# Patient Record
Sex: FEMALE | HISPANIC OR LATINO | URBAN - METROPOLITAN AREA
[De-identification: names, ages, dates, MRNs, and addresses within clinical notes are randomized per-mention and may not be internally consistent; named-entity substitution may affect disease eponyms.]

---

## 2023-01-04 ENCOUNTER — OFFICE VISIT (OUTPATIENT)
Dept: FAMILY MEDICINE CLINIC | Facility: CLINIC | Age: 73
End: 2023-01-04
Payer: MEDICAID

## 2023-01-04 VITALS
WEIGHT: 157 LBS | TEMPERATURE: 98 F | SYSTOLIC BLOOD PRESSURE: 178 MMHG | HEART RATE: 71 BPM | OXYGEN SATURATION: 99 % | BODY MASS INDEX: 27.82 KG/M2 | DIASTOLIC BLOOD PRESSURE: 90 MMHG | HEIGHT: 63 IN

## 2023-01-04 DIAGNOSIS — Z85.3 HISTORY OF BREAST CANCER: ICD-10-CM

## 2023-01-04 DIAGNOSIS — M25.551 RIGHT HIP PAIN: Primary | ICD-10-CM

## 2023-01-04 DIAGNOSIS — I10 BENIGN ESSENTIAL HYPERTENSION: ICD-10-CM

## 2023-01-04 DIAGNOSIS — Z12.31 ENCOUNTER FOR SCREENING MAMMOGRAM FOR MALIGNANT NEOPLASM OF BREAST: ICD-10-CM

## 2023-01-04 DIAGNOSIS — Z12.11 SCREENING FOR COLON CANCER: ICD-10-CM

## 2023-01-04 DIAGNOSIS — R73.9 ELEVATED BLOOD SUGAR: ICD-10-CM

## 2023-01-04 DIAGNOSIS — E03.3 POST-INFECTIOUS HYPOTHYROIDISM: ICD-10-CM

## 2023-01-04 DIAGNOSIS — F41.9 ANXIETY: ICD-10-CM

## 2023-01-04 DIAGNOSIS — E78.2 HYPERLIPIDEMIA, MIXED: ICD-10-CM

## 2023-01-04 DIAGNOSIS — Z23 NEED FOR VACCINATION: ICD-10-CM

## 2023-01-04 PROBLEM — Z79.811 AROMATASE INHIBITOR USE: Status: ACTIVE | Noted: 2020-02-10

## 2023-01-04 PROBLEM — Z00.00 ROUTINE HISTORY AND PHYSICAL EXAMINATION OF ADULT: Status: ACTIVE | Noted: 2021-09-22

## 2023-01-04 PROBLEM — D05.12 BREAST NEOPLASM, TIS (DCIS), LEFT: Status: ACTIVE | Noted: 2019-11-19

## 2023-01-04 PROBLEM — M81.0 OSTEOPOROSIS: Status: ACTIVE | Noted: 2020-01-13

## 2023-01-04 PROCEDURE — 90471 IMMUNIZATION ADMIN: CPT | Performed by: STUDENT IN AN ORGANIZED HEALTH CARE EDUCATION/TRAINING PROGRAM

## 2023-01-04 PROCEDURE — 73502 X-RAY EXAM HIP UNI 2-3 VIEWS: CPT | Performed by: STUDENT IN AN ORGANIZED HEALTH CARE EDUCATION/TRAINING PROGRAM

## 2023-01-04 PROCEDURE — 90677 PCV20 VACCINE IM: CPT | Performed by: STUDENT IN AN ORGANIZED HEALTH CARE EDUCATION/TRAINING PROGRAM

## 2023-01-04 PROCEDURE — 99204 OFFICE O/P NEW MOD 45 MIN: CPT | Performed by: STUDENT IN AN ORGANIZED HEALTH CARE EDUCATION/TRAINING PROGRAM

## 2023-01-04 RX ORDER — ATORVASTATIN CALCIUM 80 MG/1
80 TABLET, FILM COATED ORAL DAILY
COMMUNITY
Start: 2022-09-22 | End: 2023-01-04 | Stop reason: SDUPTHER

## 2023-01-04 RX ORDER — ANASTROZOLE 1 MG/1
1 TABLET ORAL DAILY
COMMUNITY
Start: 2022-10-14 | End: 2023-01-04 | Stop reason: SDUPTHER

## 2023-01-04 RX ORDER — LEVOTHYROXINE SODIUM 0.05 MG/1
50 TABLET ORAL
Qty: 90 TABLET | Refills: 3 | Status: SHIPPED | OUTPATIENT
Start: 2023-01-04

## 2023-01-04 RX ORDER — B-COMPLEX WITH VITAMIN C
1 TABLET ORAL
COMMUNITY
Start: 2022-09-22 | End: 2023-03-21

## 2023-01-04 RX ORDER — ANASTROZOLE 1 MG/1
1 TABLET ORAL DAILY
Qty: 90 TABLET | Refills: 3 | Status: SHIPPED | OUTPATIENT
Start: 2023-01-04

## 2023-01-04 RX ORDER — FLUOXETINE HYDROCHLORIDE 20 MG/1
20 CAPSULE ORAL DAILY
Qty: 90 CAPSULE | Refills: 3 | Status: SHIPPED | OUTPATIENT
Start: 2023-01-04 | End: 2023-02-15

## 2023-01-04 RX ORDER — ENALAPRIL MALEATE 20 MG/1
20 TABLET ORAL 2 TIMES DAILY
COMMUNITY
Start: 2022-09-22 | End: 2023-01-04 | Stop reason: SDUPTHER

## 2023-01-04 RX ORDER — AMLODIPINE BESYLATE 5 MG/1
5 TABLET ORAL DAILY
Qty: 90 TABLET | Refills: 3 | Status: SHIPPED | OUTPATIENT
Start: 2023-01-04 | End: 2023-07-03

## 2023-01-04 RX ORDER — LEVOTHYROXINE SODIUM 0.05 MG/1
TABLET ORAL
COMMUNITY
Start: 2022-12-21 | End: 2023-01-04 | Stop reason: SDUPTHER

## 2023-01-04 RX ORDER — AMLODIPINE BESYLATE 5 MG/1
5 TABLET ORAL DAILY
COMMUNITY
Start: 2022-09-22 | End: 2023-01-04 | Stop reason: SDUPTHER

## 2023-01-04 RX ORDER — CHLORTHALIDONE 25 MG/1
25 TABLET ORAL DAILY
COMMUNITY
Start: 2022-09-22 | End: 2023-01-04 | Stop reason: SDUPTHER

## 2023-01-04 RX ORDER — MELOXICAM 15 MG/1
15 TABLET ORAL DAILY
Qty: 30 TABLET | Refills: 2 | Status: SHIPPED | OUTPATIENT
Start: 2023-01-04

## 2023-01-04 RX ORDER — CHLORTHALIDONE 25 MG/1
25 TABLET ORAL DAILY
Qty: 90 TABLET | Refills: 3 | Status: SHIPPED | OUTPATIENT
Start: 2023-01-04 | End: 2023-07-03

## 2023-01-04 RX ORDER — FLUOXETINE HYDROCHLORIDE 20 MG/1
20 CAPSULE ORAL DAILY
COMMUNITY
Start: 2022-09-22 | End: 2023-01-04 | Stop reason: SDUPTHER

## 2023-01-04 RX ORDER — ENALAPRIL MALEATE 20 MG/1
40 TABLET ORAL DAILY
Qty: 180 TABLET | Refills: 3 | Status: SHIPPED | OUTPATIENT
Start: 2023-01-04 | End: 2023-02-15 | Stop reason: SDUPTHER

## 2023-01-04 RX ORDER — ATORVASTATIN CALCIUM 80 MG/1
80 TABLET, FILM COATED ORAL DAILY
Qty: 90 TABLET | Refills: 3 | Status: SHIPPED | OUTPATIENT
Start: 2023-01-04 | End: 2023-07-03

## 2023-01-05 LAB
ALBUMIN SERPL-MCNC: 5.3 G/DL (ref 3.5–5.2)
ALBUMIN/GLOB SERPL: 2 G/DL
ALP SERPL-CCNC: 84 U/L (ref 39–117)
ALT SERPL-CCNC: 15 U/L (ref 1–33)
AST SERPL-CCNC: 18 U/L (ref 1–32)
BILIRUB SERPL-MCNC: 0.4 MG/DL (ref 0–1.2)
BUN SERPL-MCNC: 24 MG/DL (ref 8–23)
BUN/CREAT SERPL: 28.9 (ref 7–25)
CALCIUM SERPL-MCNC: 10.5 MG/DL (ref 8.6–10.5)
CHLORIDE SERPL-SCNC: 101 MMOL/L (ref 98–107)
CHOLEST SERPL-MCNC: 256 MG/DL (ref 0–200)
CO2 SERPL-SCNC: 27.6 MMOL/L (ref 22–29)
CREAT SERPL-MCNC: 0.83 MG/DL (ref 0.57–1)
EGFRCR SERPLBLD CKD-EPI 2021: 75.5 ML/MIN/1.73
GLOBULIN SER CALC-MCNC: 2.7 GM/DL
GLUCOSE SERPL-MCNC: 99 MG/DL (ref 65–99)
HBA1C MFR BLD: 5.8 % (ref 4.8–5.6)
HDLC SERPL-MCNC: 114 MG/DL (ref 40–60)
LDLC SERPL CALC-MCNC: 129 MG/DL (ref 0–100)
POTASSIUM SERPL-SCNC: 5.5 MMOL/L (ref 3.5–5.2)
PROT SERPL-MCNC: 8 G/DL (ref 6–8.5)
SODIUM SERPL-SCNC: 142 MMOL/L (ref 136–145)
TRIGL SERPL-MCNC: 77 MG/DL (ref 0–150)
TSH SERPL DL<=0.005 MIU/L-ACNC: 1.21 UIU/ML (ref 0.27–4.2)
VLDLC SERPL CALC-MCNC: 13 MG/DL (ref 5–40)

## 2023-01-16 ENCOUNTER — OFFICE VISIT (OUTPATIENT)
Dept: SPORTS MEDICINE | Facility: CLINIC | Age: 73
End: 2023-01-16
Payer: MEDICAID

## 2023-01-16 VITALS
DIASTOLIC BLOOD PRESSURE: 90 MMHG | SYSTOLIC BLOOD PRESSURE: 150 MMHG | RESPIRATION RATE: 16 BRPM | TEMPERATURE: 98 F | HEIGHT: 63 IN | BODY MASS INDEX: 27.82 KG/M2 | OXYGEN SATURATION: 99 % | HEART RATE: 100 BPM | WEIGHT: 157 LBS

## 2023-01-16 DIAGNOSIS — M16.11 ARTHRITIS OF RIGHT HIP: Primary | Chronic | ICD-10-CM

## 2023-01-16 PROCEDURE — 99214 OFFICE O/P EST MOD 30 MIN: CPT | Performed by: FAMILY MEDICINE

## 2023-01-19 ENCOUNTER — HOSPITAL ENCOUNTER (OUTPATIENT)
Dept: MAMMOGRAPHY | Facility: HOSPITAL | Age: 73
Discharge: HOME | End: 2023-01-19
Admitting: STUDENT IN AN ORGANIZED HEALTH CARE EDUCATION/TRAINING PROGRAM
Payer: MEDICAID

## 2023-01-19 DIAGNOSIS — Z12.31 ENCOUNTER FOR SCREENING MAMMOGRAM FOR MALIGNANT NEOPLASM OF BREAST: ICD-10-CM

## 2023-01-19 PROCEDURE — 77063 BREAST TOMOSYNTHESIS BI: CPT

## 2023-01-19 PROCEDURE — 77067 SCR MAMMO BI INCL CAD: CPT

## 2023-01-26 ENCOUNTER — PROCEDURE VISIT (OUTPATIENT)
Dept: SPORTS MEDICINE | Facility: CLINIC | Age: 73
End: 2023-01-26
Payer: MEDICAID

## 2023-01-26 VITALS
SYSTOLIC BLOOD PRESSURE: 152 MMHG | HEART RATE: 81 BPM | TEMPERATURE: 98.4 F | OXYGEN SATURATION: 97 % | DIASTOLIC BLOOD PRESSURE: 78 MMHG

## 2023-01-26 DIAGNOSIS — M16.11 ARTHRITIS OF RIGHT HIP: Primary | ICD-10-CM

## 2023-01-26 PROCEDURE — 20611 DRAIN/INJ JOINT/BURSA W/US: CPT | Performed by: FAMILY MEDICINE

## 2023-01-26 RX ORDER — TRIAMCINOLONE ACETONIDE 40 MG/ML
80 INJECTION, SUSPENSION INTRA-ARTICULAR; INTRAMUSCULAR ONCE
Status: COMPLETED | OUTPATIENT
Start: 2023-01-26 | End: 2023-01-26

## 2023-01-26 RX ADMIN — TRIAMCINOLONE ACETONIDE 80 MG: 40 INJECTION, SUSPENSION INTRA-ARTICULAR; INTRAMUSCULAR at 16:40

## 2023-02-15 ENCOUNTER — OFFICE VISIT (OUTPATIENT)
Dept: FAMILY MEDICINE CLINIC | Facility: CLINIC | Age: 73
End: 2023-02-15
Payer: MEDICAID

## 2023-02-15 VITALS
OXYGEN SATURATION: 98 % | HEART RATE: 83 BPM | DIASTOLIC BLOOD PRESSURE: 60 MMHG | BODY MASS INDEX: 26.75 KG/M2 | SYSTOLIC BLOOD PRESSURE: 116 MMHG | TEMPERATURE: 97.3 F | HEIGHT: 63 IN | WEIGHT: 151 LBS

## 2023-02-15 DIAGNOSIS — G89.29 CHRONIC PAIN OF RIGHT KNEE: Primary | ICD-10-CM

## 2023-02-15 DIAGNOSIS — M25.561 CHRONIC PAIN OF RIGHT KNEE: Primary | ICD-10-CM

## 2023-02-15 DIAGNOSIS — I10 BENIGN ESSENTIAL HYPERTENSION: ICD-10-CM

## 2023-02-15 PROBLEM — M25.551 RIGHT HIP PAIN: Status: RESOLVED | Noted: 2023-01-04 | Resolved: 2023-02-15

## 2023-02-15 PROBLEM — M16.11 OSTEOARTHRITIS OF RIGHT HIP: Status: ACTIVE | Noted: 2023-02-15

## 2023-02-15 PROCEDURE — 73562 X-RAY EXAM OF KNEE 3: CPT | Performed by: STUDENT IN AN ORGANIZED HEALTH CARE EDUCATION/TRAINING PROGRAM

## 2023-02-15 PROCEDURE — 99214 OFFICE O/P EST MOD 30 MIN: CPT | Performed by: STUDENT IN AN ORGANIZED HEALTH CARE EDUCATION/TRAINING PROGRAM

## 2023-02-15 RX ORDER — ERGOCALCIFEROL 1.25 MG/1
50000 CAPSULE ORAL
COMMUNITY
Start: 2022-09-22 | End: 2023-03-21

## 2023-02-15 RX ORDER — ENALAPRIL MALEATE 20 MG/1
40 TABLET ORAL 2 TIMES DAILY
Qty: 180 TABLET | Refills: 3 | Status: SHIPPED | OUTPATIENT
Start: 2023-02-15 | End: 2023-03-01 | Stop reason: SDUPTHER

## 2023-03-01 ENCOUNTER — TELEPHONE (OUTPATIENT)
Dept: FAMILY MEDICINE CLINIC | Facility: CLINIC | Age: 73
End: 2023-03-01
Payer: MEDICAID

## 2023-03-01 DIAGNOSIS — I10 BENIGN ESSENTIAL HYPERTENSION: ICD-10-CM

## 2023-03-01 RX ORDER — ENALAPRIL MALEATE 20 MG/1
40 TABLET ORAL DAILY
Qty: 180 TABLET | Refills: 3 | Status: SHIPPED | OUTPATIENT
Start: 2023-03-01 | End: 2023-03-06 | Stop reason: SDUPTHER

## 2023-03-06 DIAGNOSIS — I10 BENIGN ESSENTIAL HYPERTENSION: ICD-10-CM

## 2023-03-06 RX ORDER — ENALAPRIL MALEATE 20 MG/1
40 TABLET ORAL DAILY
Qty: 180 TABLET | Refills: 1 | Status: SHIPPED | OUTPATIENT
Start: 2023-03-06

## 2023-03-22 ENCOUNTER — TELEPHONE (OUTPATIENT)
Dept: FAMILY MEDICINE CLINIC | Facility: CLINIC | Age: 73
End: 2023-03-22
Payer: MEDICAID

## 2023-05-09 RX ORDER — ERGOCALCIFEROL 1.25 MG/1
50000 CAPSULE ORAL WEEKLY
Qty: 12 CAPSULE | Refills: 2 | Status: SHIPPED | OUTPATIENT
Start: 2023-05-09

## 2023-06-06 ENCOUNTER — PRIOR AUTHORIZATION (OUTPATIENT)
Dept: FAMILY MEDICINE CLINIC | Facility: CLINIC | Age: 73
End: 2023-06-06
Payer: MEDICAID

## 2023-09-08 ENCOUNTER — TELEPHONE (OUTPATIENT)
Dept: SPORTS MEDICINE | Facility: CLINIC | Age: 73
End: 2023-09-08

## 2023-09-13 ENCOUNTER — OFFICE VISIT (OUTPATIENT)
Dept: FAMILY MEDICINE CLINIC | Facility: CLINIC | Age: 73
End: 2023-09-13
Payer: MEDICAID

## 2023-09-13 VITALS
HEIGHT: 63 IN | OXYGEN SATURATION: 97 % | BODY MASS INDEX: 27.82 KG/M2 | WEIGHT: 157 LBS | HEART RATE: 60 BPM | DIASTOLIC BLOOD PRESSURE: 84 MMHG | TEMPERATURE: 97.6 F | SYSTOLIC BLOOD PRESSURE: 136 MMHG

## 2023-09-13 DIAGNOSIS — E78.2 HYPERLIPIDEMIA, MIXED: ICD-10-CM

## 2023-09-13 DIAGNOSIS — Z00.00 ENCOUNTER FOR HEALTH MAINTENANCE EXAMINATION IN ADULT: Primary | ICD-10-CM

## 2023-09-13 DIAGNOSIS — R73.09 ELEVATED HEMOGLOBIN A1C: ICD-10-CM

## 2023-09-13 DIAGNOSIS — I10 BENIGN ESSENTIAL HYPERTENSION: ICD-10-CM

## 2023-09-13 DIAGNOSIS — Z13.0 SCREENING FOR DEFICIENCY ANEMIA: ICD-10-CM

## 2023-09-13 DIAGNOSIS — E03.3 POST-INFECTIOUS HYPOTHYROIDISM: ICD-10-CM

## 2023-09-13 PROCEDURE — 3079F DIAST BP 80-89 MM HG: CPT | Performed by: STUDENT IN AN ORGANIZED HEALTH CARE EDUCATION/TRAINING PROGRAM

## 2023-09-13 PROCEDURE — 3075F SYST BP GE 130 - 139MM HG: CPT | Performed by: STUDENT IN AN ORGANIZED HEALTH CARE EDUCATION/TRAINING PROGRAM

## 2023-09-13 PROCEDURE — 1160F RVW MEDS BY RX/DR IN RCRD: CPT | Performed by: STUDENT IN AN ORGANIZED HEALTH CARE EDUCATION/TRAINING PROGRAM

## 2023-09-13 PROCEDURE — 1159F MED LIST DOCD IN RCRD: CPT | Performed by: STUDENT IN AN ORGANIZED HEALTH CARE EDUCATION/TRAINING PROGRAM

## 2023-09-13 PROCEDURE — 99397 PER PM REEVAL EST PAT 65+ YR: CPT | Performed by: STUDENT IN AN ORGANIZED HEALTH CARE EDUCATION/TRAINING PROGRAM

## 2023-09-14 LAB
ALBUMIN SERPL-MCNC: 4.6 G/DL (ref 3.5–5.2)
ALBUMIN/GLOB SERPL: 1.7 G/DL
ALP SERPL-CCNC: 89 U/L (ref 39–117)
ALT SERPL-CCNC: 19 U/L (ref 1–33)
AST SERPL-CCNC: 40 U/L (ref 1–32)
BASOPHILS # BLD AUTO: 0.05 10*3/MM3 (ref 0–0.2)
BASOPHILS NFR BLD AUTO: 0.8 % (ref 0–1.5)
BILIRUB SERPL-MCNC: 0.5 MG/DL (ref 0–1.2)
BUN SERPL-MCNC: 26 MG/DL (ref 8–23)
BUN/CREAT SERPL: 32.9 (ref 7–25)
CALCIUM SERPL-MCNC: 10.3 MG/DL (ref 8.6–10.5)
CHLORIDE SERPL-SCNC: 101 MMOL/L (ref 98–107)
CHOLEST SERPL-MCNC: 202 MG/DL (ref 0–200)
CO2 SERPL-SCNC: 30.3 MMOL/L (ref 22–29)
CREAT SERPL-MCNC: 0.79 MG/DL (ref 0.57–1)
EGFRCR SERPLBLD CKD-EPI 2021: 79.6 ML/MIN/1.73
EOSINOPHIL # BLD AUTO: 0.34 10*3/MM3 (ref 0–0.4)
EOSINOPHIL NFR BLD AUTO: 5.3 % (ref 0.3–6.2)
ERYTHROCYTE [DISTWIDTH] IN BLOOD BY AUTOMATED COUNT: 11.9 % (ref 12.3–15.4)
GLOBULIN SER CALC-MCNC: 2.7 GM/DL
GLUCOSE SERPL-MCNC: 98 MG/DL (ref 65–99)
HBA1C MFR BLD: 6.5 % (ref 4.8–5.6)
HCT VFR BLD AUTO: 36.4 % (ref 34–46.6)
HDLC SERPL-MCNC: 89 MG/DL (ref 40–60)
HGB BLD-MCNC: 12.3 G/DL (ref 12–15.9)
IMM GRANULOCYTES # BLD AUTO: 0.02 10*3/MM3 (ref 0–0.05)
IMM GRANULOCYTES NFR BLD AUTO: 0.3 % (ref 0–0.5)
LDLC SERPL CALC-MCNC: 100 MG/DL (ref 0–100)
LYMPHOCYTES # BLD AUTO: 1.79 10*3/MM3 (ref 0.7–3.1)
LYMPHOCYTES NFR BLD AUTO: 27.8 % (ref 19.6–45.3)
MCH RBC QN AUTO: 31 PG (ref 26.6–33)
MCHC RBC AUTO-ENTMCNC: 33.8 G/DL (ref 31.5–35.7)
MCV RBC AUTO: 91.7 FL (ref 79–97)
MONOCYTES # BLD AUTO: 0.56 10*3/MM3 (ref 0.1–0.9)
MONOCYTES NFR BLD AUTO: 8.7 % (ref 5–12)
NEUTROPHILS # BLD AUTO: 3.69 10*3/MM3 (ref 1.7–7)
NEUTROPHILS NFR BLD AUTO: 57.1 % (ref 42.7–76)
NRBC BLD AUTO-RTO: 0 /100 WBC (ref 0–0.2)
PLATELET # BLD AUTO: 264 10*3/MM3 (ref 140–450)
POTASSIUM SERPL-SCNC: 4.7 MMOL/L (ref 3.5–5.2)
PROT SERPL-MCNC: 7.3 G/DL (ref 6–8.5)
RBC # BLD AUTO: 3.97 10*6/MM3 (ref 3.77–5.28)
SODIUM SERPL-SCNC: 140 MMOL/L (ref 136–145)
TRIGL SERPL-MCNC: 75 MG/DL (ref 0–150)
TSH SERPL DL<=0.005 MIU/L-ACNC: 2.41 UIU/ML (ref 0.27–4.2)
VLDLC SERPL CALC-MCNC: 13 MG/DL (ref 5–40)
WBC # BLD AUTO: 6.45 10*3/MM3 (ref 3.4–10.8)

## 2023-10-04 ENCOUNTER — OFFICE VISIT (OUTPATIENT)
Dept: SPORTS MEDICINE | Facility: CLINIC | Age: 73
End: 2023-10-04
Payer: MEDICARE

## 2023-10-04 VITALS
WEIGHT: 157 LBS | BODY MASS INDEX: 27.82 KG/M2 | TEMPERATURE: 98.3 F | HEIGHT: 63 IN | OXYGEN SATURATION: 98 % | HEART RATE: 75 BPM | SYSTOLIC BLOOD PRESSURE: 180 MMHG | DIASTOLIC BLOOD PRESSURE: 88 MMHG

## 2023-10-04 DIAGNOSIS — M16.11 ARTHRITIS OF RIGHT HIP: Primary | ICD-10-CM

## 2023-10-26 RX ORDER — ATORVASTATIN CALCIUM 80 MG/1
80 TABLET, FILM COATED ORAL DAILY
Qty: 90 TABLET | Refills: 1 | Status: SHIPPED | OUTPATIENT
Start: 2023-10-26

## 2023-12-31 DIAGNOSIS — I10 BENIGN ESSENTIAL HYPERTENSION: ICD-10-CM

## 2023-12-31 DIAGNOSIS — Z85.3 HISTORY OF BREAST CANCER: ICD-10-CM

## 2024-01-02 RX ORDER — CHLORTHALIDONE 25 MG/1
25 TABLET ORAL DAILY
Qty: 90 TABLET | Refills: 1 | Status: SHIPPED | OUTPATIENT
Start: 2024-01-02

## 2024-01-02 RX ORDER — ANASTROZOLE 1 MG/1
1 TABLET ORAL DAILY
Qty: 90 TABLET | Refills: 3 | Status: SHIPPED | OUTPATIENT
Start: 2024-01-02

## 2024-01-02 RX ORDER — AMLODIPINE BESYLATE 5 MG/1
5 TABLET ORAL DAILY
Qty: 90 TABLET | Refills: 3 | OUTPATIENT
Start: 2024-01-02

## 2024-01-15 DIAGNOSIS — I10 BENIGN ESSENTIAL HYPERTENSION: ICD-10-CM

## 2024-01-15 RX ORDER — ENALAPRIL MALEATE 20 MG/1
TABLET ORAL
Qty: 180 TABLET | Refills: 1 | Status: SHIPPED | OUTPATIENT
Start: 2024-01-15

## 2024-02-05 DIAGNOSIS — M25.551 RIGHT HIP PAIN: ICD-10-CM

## 2024-02-05 DIAGNOSIS — E03.3 POST-INFECTIOUS HYPOTHYROIDISM: ICD-10-CM

## 2024-02-05 RX ORDER — LEVOTHYROXINE SODIUM 0.05 MG/1
50 TABLET ORAL
Qty: 90 TABLET | Refills: 3 | Status: SHIPPED | OUTPATIENT
Start: 2024-02-05

## 2024-02-05 RX ORDER — MELOXICAM 15 MG/1
15 TABLET ORAL DAILY
Qty: 90 TABLET | Refills: 1 | Status: SHIPPED | OUTPATIENT
Start: 2024-02-05

## 2024-05-09 RX ORDER — ATORVASTATIN CALCIUM 80 MG/1
80 TABLET, FILM COATED ORAL DAILY
Qty: 90 TABLET | Refills: 1 | Status: SHIPPED | OUTPATIENT
Start: 2024-05-09

## 2024-05-22 ENCOUNTER — OFFICE VISIT (OUTPATIENT)
Dept: SPORTS MEDICINE | Facility: CLINIC | Age: 74
End: 2024-05-22
Payer: MEDICARE

## 2024-05-22 ENCOUNTER — OFFICE VISIT (OUTPATIENT)
Dept: FAMILY MEDICINE CLINIC | Facility: CLINIC | Age: 74
End: 2024-05-22
Payer: MEDICARE

## 2024-05-22 VITALS
HEIGHT: 63 IN | SYSTOLIC BLOOD PRESSURE: 130 MMHG | BODY MASS INDEX: 27.46 KG/M2 | RESPIRATION RATE: 16 BRPM | DIASTOLIC BLOOD PRESSURE: 80 MMHG | OXYGEN SATURATION: 98 % | WEIGHT: 155 LBS | HEART RATE: 73 BPM

## 2024-05-22 VITALS
HEART RATE: 76 BPM | SYSTOLIC BLOOD PRESSURE: 132 MMHG | HEIGHT: 63 IN | DIASTOLIC BLOOD PRESSURE: 78 MMHG | OXYGEN SATURATION: 97 % | WEIGHT: 155 LBS | BODY MASS INDEX: 27.46 KG/M2

## 2024-05-22 DIAGNOSIS — E11.9 TYPE 2 DIABETES MELLITUS WITHOUT COMPLICATION, WITHOUT LONG-TERM CURRENT USE OF INSULIN: Primary | ICD-10-CM

## 2024-05-22 DIAGNOSIS — M16.11 PRIMARY OSTEOARTHRITIS OF RIGHT HIP: Primary | ICD-10-CM

## 2024-05-22 DIAGNOSIS — M25.551 PAIN OF RIGHT HIP: ICD-10-CM

## 2024-05-22 DIAGNOSIS — I10 BENIGN ESSENTIAL HYPERTENSION: ICD-10-CM

## 2024-05-22 DIAGNOSIS — Z12.31 ENCOUNTER FOR SCREENING MAMMOGRAM FOR MALIGNANT NEOPLASM OF BREAST: ICD-10-CM

## 2024-05-22 PROCEDURE — 20611 DRAIN/INJ JOINT/BURSA W/US: CPT | Performed by: FAMILY MEDICINE

## 2024-05-22 PROCEDURE — 1159F MED LIST DOCD IN RCRD: CPT | Performed by: STUDENT IN AN ORGANIZED HEALTH CARE EDUCATION/TRAINING PROGRAM

## 2024-05-22 PROCEDURE — 1125F AMNT PAIN NOTED PAIN PRSNT: CPT | Performed by: STUDENT IN AN ORGANIZED HEALTH CARE EDUCATION/TRAINING PROGRAM

## 2024-05-22 PROCEDURE — 3078F DIAST BP <80 MM HG: CPT | Performed by: STUDENT IN AN ORGANIZED HEALTH CARE EDUCATION/TRAINING PROGRAM

## 2024-05-22 PROCEDURE — 3075F SYST BP GE 130 - 139MM HG: CPT | Performed by: STUDENT IN AN ORGANIZED HEALTH CARE EDUCATION/TRAINING PROGRAM

## 2024-05-22 PROCEDURE — 99213 OFFICE O/P EST LOW 20 MIN: CPT | Performed by: FAMILY MEDICINE

## 2024-05-22 PROCEDURE — 3079F DIAST BP 80-89 MM HG: CPT | Performed by: FAMILY MEDICINE

## 2024-05-22 PROCEDURE — 99214 OFFICE O/P EST MOD 30 MIN: CPT | Performed by: STUDENT IN AN ORGANIZED HEALTH CARE EDUCATION/TRAINING PROGRAM

## 2024-05-22 PROCEDURE — 3075F SYST BP GE 130 - 139MM HG: CPT | Performed by: FAMILY MEDICINE

## 2024-05-22 PROCEDURE — 1160F RVW MEDS BY RX/DR IN RCRD: CPT | Performed by: FAMILY MEDICINE

## 2024-05-22 PROCEDURE — 1159F MED LIST DOCD IN RCRD: CPT | Performed by: FAMILY MEDICINE

## 2024-05-22 PROCEDURE — 1160F RVW MEDS BY RX/DR IN RCRD: CPT | Performed by: STUDENT IN AN ORGANIZED HEALTH CARE EDUCATION/TRAINING PROGRAM

## 2024-05-22 RX ORDER — TRIAMCINOLONE ACETONIDE 40 MG/ML
80 INJECTION, SUSPENSION INTRA-ARTICULAR; INTRAMUSCULAR
Status: DISCONTINUED | OUTPATIENT
Start: 2024-05-22 | End: 2024-05-22 | Stop reason: HOSPADM

## 2024-05-22 RX ORDER — ENALAPRIL MALEATE 20 MG/1
40 TABLET ORAL DAILY
Qty: 180 TABLET | Refills: 3 | Status: SHIPPED | OUTPATIENT
Start: 2024-05-22

## 2024-05-22 RX ADMIN — TRIAMCINOLONE ACETONIDE 80 MG: 40 INJECTION, SUSPENSION INTRA-ARTICULAR; INTRAMUSCULAR at 16:25

## 2024-08-20 ENCOUNTER — OFFICE VISIT (OUTPATIENT)
Dept: ORTHOPEDIC SURGERY | Facility: CLINIC | Age: 74
End: 2024-08-20
Payer: MEDICARE

## 2024-08-20 VITALS — TEMPERATURE: 97.5 F | WEIGHT: 156.8 LBS | HEIGHT: 63 IN | BODY MASS INDEX: 27.78 KG/M2

## 2024-08-20 DIAGNOSIS — M16.11 PRIMARY OSTEOARTHRITIS OF RIGHT HIP: Primary | ICD-10-CM

## 2024-08-20 PROBLEM — M16.9 OA (OSTEOARTHRITIS) OF HIP: Status: ACTIVE | Noted: 2024-08-20

## 2024-08-20 PROCEDURE — 99204 OFFICE O/P NEW MOD 45 MIN: CPT | Performed by: ORTHOPAEDIC SURGERY

## 2024-08-20 PROCEDURE — 1159F MED LIST DOCD IN RCRD: CPT | Performed by: ORTHOPAEDIC SURGERY

## 2024-08-20 PROCEDURE — 1160F RVW MEDS BY RX/DR IN RCRD: CPT | Performed by: ORTHOPAEDIC SURGERY

## 2024-08-20 RX ORDER — CHLORHEXIDINE GLUCONATE 500 MG/1
CLOTH TOPICAL 2 TIMES DAILY
OUTPATIENT
Start: 2024-08-20

## 2024-08-20 RX ORDER — PREGABALIN 150 MG/1
150 CAPSULE ORAL ONCE
OUTPATIENT
Start: 2024-08-20 | End: 2024-08-20

## 2024-08-20 RX ORDER — MELOXICAM 7.5 MG/1
15 TABLET ORAL ONCE
OUTPATIENT
Start: 2024-08-20 | End: 2024-08-20

## 2024-10-02 DIAGNOSIS — I10 BENIGN ESSENTIAL HYPERTENSION: ICD-10-CM

## 2024-10-02 RX ORDER — CHLORTHALIDONE 25 MG/1
25 TABLET ORAL DAILY
Qty: 90 TABLET | Refills: 1 | Status: SHIPPED | OUTPATIENT
Start: 2024-10-02

## 2024-10-08 DIAGNOSIS — I10 BENIGN ESSENTIAL HYPERTENSION: ICD-10-CM

## 2024-10-08 DIAGNOSIS — M25.551 RIGHT HIP PAIN: ICD-10-CM

## 2024-10-08 RX ORDER — MELOXICAM 15 MG/1
15 TABLET ORAL DAILY
Qty: 90 TABLET | Refills: 1 | Status: SHIPPED | OUTPATIENT
Start: 2024-10-08

## 2024-10-08 RX ORDER — ATORVASTATIN CALCIUM 80 MG/1
80 TABLET, FILM COATED ORAL DAILY
Qty: 90 TABLET | Refills: 1 | Status: SHIPPED | OUTPATIENT
Start: 2024-10-08

## 2024-10-08 RX ORDER — CHLORTHALIDONE 25 MG/1
25 TABLET ORAL DAILY
Qty: 90 TABLET | Refills: 1 | Status: SHIPPED | OUTPATIENT
Start: 2024-10-08

## 2025-01-20 DIAGNOSIS — I10 BENIGN ESSENTIAL HYPERTENSION: ICD-10-CM

## 2025-01-20 DIAGNOSIS — Z85.3 HISTORY OF BREAST CANCER: ICD-10-CM

## 2025-01-20 DIAGNOSIS — E03.3 POST-INFECTIOUS HYPOTHYROIDISM: ICD-10-CM

## 2025-01-20 RX ORDER — ANASTROZOLE 1 MG/1
1 TABLET ORAL DAILY
Qty: 90 TABLET | Refills: 3 | Status: SHIPPED | OUTPATIENT
Start: 2025-01-20

## 2025-01-20 RX ORDER — LEVOTHYROXINE SODIUM 50 UG/1
50 TABLET ORAL
Qty: 90 TABLET | Refills: 3 | Status: SHIPPED | OUTPATIENT
Start: 2025-01-20

## 2025-01-20 RX ORDER — CHLORTHALIDONE 25 MG/1
25 TABLET ORAL DAILY
Qty: 90 TABLET | Refills: 1 | Status: SHIPPED | OUTPATIENT
Start: 2025-01-20

## 2025-01-20 RX ORDER — ATORVASTATIN CALCIUM 80 MG/1
80 TABLET, FILM COATED ORAL DAILY
Qty: 90 TABLET | Refills: 1 | Status: SHIPPED | OUTPATIENT
Start: 2025-01-20

## 2025-01-20 RX ORDER — ENALAPRIL MALEATE 20 MG/1
40 TABLET ORAL DAILY
Qty: 180 TABLET | Refills: 3 | Status: SHIPPED | OUTPATIENT
Start: 2025-01-20

## 2025-01-27 ENCOUNTER — PRE-ADMISSION TESTING (OUTPATIENT)
Dept: PREADMISSION TESTING | Facility: HOSPITAL | Age: 75
End: 2025-01-27
Payer: MEDICARE

## 2025-01-27 VITALS
SYSTOLIC BLOOD PRESSURE: 158 MMHG | OXYGEN SATURATION: 96 % | WEIGHT: 151.8 LBS | BODY MASS INDEX: 29.8 KG/M2 | HEART RATE: 76 BPM | DIASTOLIC BLOOD PRESSURE: 76 MMHG | TEMPERATURE: 98.6 F | HEIGHT: 60 IN | RESPIRATION RATE: 18 BRPM

## 2025-01-27 LAB
ABO GROUP BLD: NORMAL
ANION GAP SERPL CALCULATED.3IONS-SCNC: 11.5 MMOL/L (ref 5–15)
BLD GP AB SCN SERPL QL: NEGATIVE
BUN SERPL-MCNC: 21 MG/DL (ref 8–23)
BUN/CREAT SERPL: 21.4 (ref 7–25)
CALCIUM SPEC-SCNC: 10.1 MG/DL (ref 8.6–10.5)
CHLORIDE SERPL-SCNC: 99 MMOL/L (ref 98–107)
CO2 SERPL-SCNC: 27.5 MMOL/L (ref 22–29)
CREAT SERPL-MCNC: 0.98 MG/DL (ref 0.57–1)
DEPRECATED RDW RBC AUTO: 41 FL (ref 37–54)
EGFRCR SERPLBLD CKD-EPI 2021: 61.1 ML/MIN/1.73
ERYTHROCYTE [DISTWIDTH] IN BLOOD BY AUTOMATED COUNT: 12.4 % (ref 12.3–15.4)
GLUCOSE SERPL-MCNC: 105 MG/DL (ref 65–99)
HCT VFR BLD AUTO: 35.3 % (ref 34–46.6)
HGB BLD-MCNC: 12 G/DL (ref 12–15.9)
MCH RBC QN AUTO: 31.1 PG (ref 26.6–33)
MCHC RBC AUTO-ENTMCNC: 34 G/DL (ref 31.5–35.7)
MCV RBC AUTO: 91.5 FL (ref 79–97)
PLATELET # BLD AUTO: 235 10*3/MM3 (ref 140–450)
PMV BLD AUTO: 11.5 FL (ref 6–12)
POTASSIUM SERPL-SCNC: 4.1 MMOL/L (ref 3.5–5.2)
QT INTERVAL: 411 MS
QTC INTERVAL: 424 MS
RBC # BLD AUTO: 3.86 10*6/MM3 (ref 3.77–5.28)
RH BLD: POSITIVE
SODIUM SERPL-SCNC: 138 MMOL/L (ref 136–145)
T&S EXPIRATION DATE: NORMAL
WBC NRBC COR # BLD AUTO: 8.51 10*3/MM3 (ref 3.4–10.8)

## 2025-01-27 PROCEDURE — 86900 BLOOD TYPING SEROLOGIC ABO: CPT | Performed by: ORTHOPAEDIC SURGERY

## 2025-01-27 PROCEDURE — 93005 ELECTROCARDIOGRAM TRACING: CPT

## 2025-01-27 PROCEDURE — 85027 COMPLETE CBC AUTOMATED: CPT

## 2025-01-27 PROCEDURE — 86850 RBC ANTIBODY SCREEN: CPT | Performed by: ORTHOPAEDIC SURGERY

## 2025-01-27 PROCEDURE — 86901 BLOOD TYPING SEROLOGIC RH(D): CPT | Performed by: ORTHOPAEDIC SURGERY

## 2025-01-27 PROCEDURE — 93010 ELECTROCARDIOGRAM REPORT: CPT | Performed by: INTERNAL MEDICINE

## 2025-01-27 PROCEDURE — 80048 BASIC METABOLIC PNL TOTAL CA: CPT

## 2025-01-27 PROCEDURE — 36415 COLL VENOUS BLD VENIPUNCTURE: CPT

## 2025-01-27 RX ORDER — CETIRIZINE HYDROCHLORIDE 5 MG/1
5 TABLET ORAL DAILY
COMMUNITY

## 2025-01-27 NOTE — DISCHARGE INSTRUCTIONS
Atalissa los siguientes medicamentos la mañana de la operación:    LEVOTHYROXINE     Si está tomando analgésicos narcóticos con receta para controlar el dolor, también puede tomarlos la mañana de la operación.         Instrucciones generales:       No coma comida sólida después de la medianoche anterior de  la operación.   Se permiten líquidos belkis el día de la cirugía, humberto deben suspenderse al menos dos horas antes de marx hora de llegada al hospital.         Líquidos belkis permitidos        Agua, refrescos y té o café sin crema ni leche añadida.         Se recomiendan de 12 a 20 onzas de un líquido telma que contenga carbohidratos.  Si no es diabético, tome Gatorade o Powerade.  Si es diabético, tome G2 o Powerade Zero.       No tome líquidos de color jon.   No consuma caldo de cass, res, cerdo o verduras ni cubitos de caldo de ninguna variedad, ya que no se consideran líquidos belkis y no están permitidos.      Los bebés pueden la nena leche materna hasta cuatro horas antes de la operación.   Los bebés que thanh fórmula pueden beber fórmula hasta seis horas antes de la operación.    Los pacientes que evitan fumar, masticar tabaco y beber alcohol jacqueline 4 semanas antes de la operación tienen un riesgo reducido de complicaciones posoperatorias. Deje de fumar tantos días antes de la operación caryn pueda.   No fume, mastique tabaco ni thee alcohol el día de la operación.    Si corresponde, traiga marx máquina C-PAP/BI-PAP el día previo a la operación.   Traiga todos los documentos que le entregaron en el consultorio del médico.   Use ropa limpia y cómoda.   No use lentes de contacto, pestañas postizas ni maquillaje. Traiga un estuche para thiago anteojos.    Traiga muletas o andador si corresponde.   Quítese todos los piercings. Deje las joyas y cualquier otro objeto de valor en casa.   Deben quitarse las extensiones de pelo con clips metálicos antes de la operación.   El enfermero de Pruebas Previas al Ingreso  (Pre-Admission Testing) le indicará que traiga medicamentos si no puede obtener morena lista precisa en las pruebas previas al ingreso.         Si le entregaron un brazalete de identificación del banco de linda, recuerde traerlo con usted el día de la operación.   Prevención de morena infección en el lugar de la operación:   Marnie 2 o 3 días antes de la operación, evite afeitarse con morena rasuradora porque esta puede irritar la piel y hacer que se desarrolle morena infección.   Cualquier área de piel abierta puede aumentar el riesgo de morena infección de la herida posoperatoria al permitir que las bacterias entren y se propaguen en todo el cuerpo. Informe a marx cirujano si tiene alguna herida/sarpullido en la piel, incluso si no está cerca del lugar de la operación. Será necesario evaluar el área para determinar si la operación debe retrasarse hasta que sane.   La noche antes de la operación, báñese con morena pastilla nueva de jabón antibacteriano (caryn Dial) y morena toalla limpia. Duerma en morena cama limpia con ropa limpia. No permita que las mascotas duerman con usted.   La mañana de la operación, báñese usando morena pastilla nueva de jabón antibacteriano (caryn Dial) y morena toalla limpia. Séquese con morena toalla limpia y vístase con ropa limpia.   Pregúntele al cirujano si recibirá antibióticos antes de la operación.   Asegúrese de que usted, marx michael y todos los proveedores de atención médica se laven las elvira con agua y jabón o con un desinfectante de elvira a base de alcohol antes de atenderlo o de curar marx herida.       INSTRUCCIONES PARA LAS TOLLAS DE CHLORHEXIDINE  La mañana de la operación, siga estas instrucciones usando las toallas de   Chlorhexidine que le entregaron. Estos pasos reducen las bacterias en el cuerpo. No   use las toallas cerca de los ojos, orejas, boca, genitales ni en heridas abiertas. Deseche   las toallas después de usarlas, humberto no intente tirarlas al inodoro.   Aba y quite morena toalla a la vez del  paquete.   Deje la toalla desdoblada y comience el baño.   Masajee la piel con las toallas ejerciendo presión suave para quitar las bacterias.  No frote con fuerza.    Siga los pasos de abajo con morena toalla con 2 % CHG por área (6 toallas en total).   Morena toalla para johnny, hombros y pecho.   Morena toalla para ambos brazos, elvira, dedos y axilas (las axilas al final).   Morena toalla para el abdomen seguido de la angela.   Morena toalla para la pierna y el pie derechos, incluyendo entre los dedos.   Morena toalla para la pierna y el pie izquierdos, incluyendo entre los dedos.   La última toalla se usará para la parte de atrás del johnny, la espalda y los glúteos.  Deje que el CHG se seque al aire jacqueline 3 minutos sobre la piel, lo que le dará tiempo   para actuar y disminuirá la posibilidad de irritación. La piel puede sentirse pegajosa   hasta que se seca. No enjuague con agua ni ningún otro líquido o perderá los efectos   beneficiosos del CHG. Si se produce morena irritación leve de la piel, enjuáguela para quitar   el CHG. Reporte esto al enfermero en el momento del ingreso. No aplique lociones,   cremas, ungüentos, desodorantes ni perfumes después de usar las toallas. Vístase con   ropa limpia antes de venir al hospital.    Día de la operación:   Marx hora de llegada es aproximadamente dos horas antes de la hora programada para marx operación. A marx llegada, un enfermero preoperatorio y un anestesista revisarán marx historia médica, obtendrán thiago signos vitales y responderán las preguntas que usted pueda tener. Las únicas cosas necesarias en jenn momento serán morena lista de thiago medicamentos de casa y, si corresponde, marx máquina C-PAP/BI-PAP. Un enfermero preoperatorio le pondrá morena vía intravenosa y es posible que reciba medicamentos caryn preparación para la operación, incluyendo algo para ayudarlo a relajarse.       Tenga en cuenta que la operación conlleva malestares. Queremos hacer todo lo posible para manejar marx malestar, por lo  que le pedimos que hable con marx enfermero sobre cualquier síntoma que no esté controlado. Lo más probable es que marx médico le recete analgésicos.      Si regresa a casa después de la operación, recibirá instrucciones de atención individualizadas por escrito antes de que le den el homer. Un adulto responsable debe estar con usted de margarita y vuelta del hospital el día de la operación y mantenerse con usted jacqueline la noche.       Las recetas del homer se pueden surtir en la farmacia del hospital jacqueline el horario habitual de la farmacia. Si la operación será al final del día/noche, es posible que marx receta la envíen electrónicamente a marx farmacia. Verifique el horario de marx farmacia o elija morena farmacia de 24 horas para evitar no poder acceder a marx receta porque marx farmacia cerró por el día.      Si va a pasar la noche en el hospital después de la operación, lo trasladarán a marx habitación después del período de recuperación. Todas las habitaciones de [facility] son privadas.      Si tiene alguna pregunta, llame a Pruebas Previas al Ingreso al [phone].   Los deducibles y copagos se cobran el día del servicio. Esté preparado para pagar    el copago, deducible o depósito necesario el día del servicio según lo establecido por  marx plan.      Llame al cirujano inmediatamente si tiene alguno de los siguientes síntomas:   Dolor de garganta   Falta de aire o dificultad para respirar   Tos   Escalofríos   Dolor de cuerpo o dolor muscular   Dolor de eugenia   Fiebre   Nueva pérdida del gusto o del olfato   No llegue enfermo a la operación. Marx procedimiento deberá reprogramarse para otro momento. Deberá llamar a marx médico antes del día de la operación para evitar exponer innecesariamente al personal del hospital y a otros pacientes.

## 2025-01-29 ENCOUNTER — OFFICE VISIT (OUTPATIENT)
Dept: FAMILY MEDICINE CLINIC | Facility: CLINIC | Age: 75
End: 2025-01-29
Payer: MEDICARE

## 2025-01-29 VITALS
SYSTOLIC BLOOD PRESSURE: 130 MMHG | HEIGHT: 60 IN | HEART RATE: 72 BPM | OXYGEN SATURATION: 97 % | BODY MASS INDEX: 30.04 KG/M2 | WEIGHT: 153 LBS | DIASTOLIC BLOOD PRESSURE: 68 MMHG | TEMPERATURE: 97.3 F

## 2025-01-29 DIAGNOSIS — J45.901 ASTHMA EXACERBATION, MILD: Primary | ICD-10-CM

## 2025-01-29 DIAGNOSIS — J20.8 VIRAL BRONCHITIS: ICD-10-CM

## 2025-01-29 DIAGNOSIS — R05.3 PERSISTENT DRY COUGH: ICD-10-CM

## 2025-01-29 DIAGNOSIS — R05.1 ACUTE COUGH: ICD-10-CM

## 2025-01-29 PROCEDURE — 99214 OFFICE O/P EST MOD 30 MIN: CPT | Performed by: STUDENT IN AN ORGANIZED HEALTH CARE EDUCATION/TRAINING PROGRAM

## 2025-01-29 PROCEDURE — 3075F SYST BP GE 130 - 139MM HG: CPT | Performed by: STUDENT IN AN ORGANIZED HEALTH CARE EDUCATION/TRAINING PROGRAM

## 2025-01-29 PROCEDURE — 1126F AMNT PAIN NOTED NONE PRSNT: CPT | Performed by: STUDENT IN AN ORGANIZED HEALTH CARE EDUCATION/TRAINING PROGRAM

## 2025-01-29 PROCEDURE — 3078F DIAST BP <80 MM HG: CPT | Performed by: STUDENT IN AN ORGANIZED HEALTH CARE EDUCATION/TRAINING PROGRAM

## 2025-01-29 RX ORDER — PREDNISONE 20 MG/1
40 TABLET ORAL DAILY
Qty: 10 TABLET | Refills: 0 | Status: SHIPPED | OUTPATIENT
Start: 2025-01-29 | End: 2025-02-03

## 2025-01-29 RX ORDER — ALBUTEROL SULFATE 90 UG/1
2 INHALANT RESPIRATORY (INHALATION) EVERY 4 HOURS PRN
Qty: 6.7 G | Refills: 0 | Status: SHIPPED | OUTPATIENT
Start: 2025-01-29

## 2025-01-29 NOTE — PROGRESS NOTES
"Chief Complaint  Cough (Pt has been experiencing the symptoms for abt 2 weeks, pt states she been having chest congestion, and tightness. Wanting to check for pneumonia neg covid/flu at home) and URI    Subjective        Olga Peres presents to [facility]  History of Present Illness    Pt here today--accompanied by her dtr and dtr-inlaw who serves as --c/o cough and chest congestion x 2 weeks  Says sx onset after flying back from the [place]  Does endorse some associated sneezing and mild nasal congestion  Denies any other sick household members during this time  Pt denies any fevers/chills or SOB over this time  Endorses remote h/o smoking over 40 years ago  Pt denies personal h/o asthma, but does have a family history of asthma  Pt does endorse h/o intermittent allergic rhinitis, for which she takes zyrtec, says usually only bothers her in the spring time  Dtr reports pt has a h/o dry cough and rattling sound in chest when sleeping that pt has had at baseline for years  She has been taking Mucinex for her sx    Dtr-in-law says pt went for pre-op appt, did blood work and EKG, but no CXR  Pt has f/u appt tmrw, where they will discuss lab results and EKG      Objective   Vital Signs:  /68   Pulse 72   Temp 97.3 °F (36.3 °C)   Ht 152.4 cm (60\")   Wt 69.4 kg (153 lb)   SpO2 97%   BMI 29.88 kg/m²   Estimated body mass index is 29.88 kg/m² as calculated from the following:    Height as of this encounter: 152.4 cm (60\").    Weight as of this encounter: 69.4 kg (153 lb).          Physical Exam  Constitutional:       General: She is not in acute distress.     Appearance: Normal appearance. She is not ill-appearing.   HENT:      Head: Normocephalic and atraumatic.      Nose: Congestion (mild congestion nasal mucosa) and rhinorrhea (clear nasal dc present) present.      Mouth/Throat:      Mouth: Mucous membranes are moist.      Pharynx: Oropharynx is " clear. No oropharyngeal exudate or posterior oropharyngeal erythema.   Eyes:      General:         Right eye: No discharge.         Left eye: No discharge.      Conjunctiva/sclera: Conjunctivae normal.   Cardiovascular:      Rate and Rhythm: Normal rate and regular rhythm.      Comments: Difficult to auscultate heart sounds due to overt wheezing and rhonchorous lung sounds, but appeared to have regular rate and rhythm  Pulmonary:      Effort: Pulmonary effort is normal. No respiratory distress.      Breath sounds: Wheezing and rhonchi present.      Comments: Wheezing and rhonchorous breath sounds throughout, most prominent in bilateral lung bases; no areas of decreased breath sounds  Lymphadenopathy:      Cervical: No cervical adenopathy.   Neurological:      Mental Status: She is alert.        Result Review :                Assessment and Plan   Diagnoses and all orders for this visit:    1. Asthma exacerbation, mild (Primary)  2. Viral bronchitis  3. Acute cough  - Due to lung exam findings, a CXR was done today. Discussed w/ pt, dtr, and dtr-in-law that I could not appreciate any areas of consolidation/infiltrate to suggest PNA.  I could appreciate atelectasis at the R lung base, likely a result of pt taking more shallow breaths to avoid exacerbating the coughing. CXR otherwise normal with no pleural effusion, PTX, and normal cardiac silhouette. No previous radiographs for comparison  -Therefore, pt advised suspect she may have some underlying reactive airway or mild asthma, which is exacerbated by likely viral URI/viral bronchitis  -Advised 5-day course of prednisone and trial of albuterol inhaler. Pt noted a long time ago she tried albuterol inhaler that caused tachycardia. Counseled on proper dosing and if tachycardia reoccurs, to dc inhaler  -Also counseled to continue Mucinex-DM, cetirizine, and consider adding on flonase    -     predniSONE (DELTASONE) 20 MG tablet; Take 2 tablets by mouth Daily for 5 days.   Dispense: 10 tablet; Refill: 0  -     albuterol sulfate  (90 Base) MCG/ACT inhaler; Inhale 2 puffs Every 4 (Four) Hours As Needed (cough, chest tightness, wheezing).  Dispense: 6.7 g; Refill: 0  -     XR Chest 2 View    4. Persistent dry cough  -discussed this may be a side effect of her Enalapril. But could also represent some mild, underlying asthma. Counseled pt can f/u with her PCP regarding this concern if dry cough persists after completed tx           Follow Up   Return if symptoms worsen or fail to improve.  Patient was given instructions and counseling regarding her condition or for health maintenance advice. Please see specific information pulled into the AVS if appropriate.

## 2025-01-30 ENCOUNTER — OFFICE VISIT (OUTPATIENT)
Dept: ORTHOPEDIC SURGERY | Facility: CLINIC | Age: 75
End: 2025-01-30
Payer: MEDICARE

## 2025-01-30 VITALS
SYSTOLIC BLOOD PRESSURE: 164 MMHG | BODY MASS INDEX: 28.81 KG/M2 | TEMPERATURE: 98.6 F | HEART RATE: 75 BPM | OXYGEN SATURATION: 96 % | DIASTOLIC BLOOD PRESSURE: 82 MMHG | HEIGHT: 61 IN | WEIGHT: 152.6 LBS

## 2025-01-30 DIAGNOSIS — R52 PAIN: Primary | ICD-10-CM

## 2025-01-30 NOTE — H&P
Patient: Olga Peres    Date of Admission: 2/10/2025    YOB: 1950    Medical Record Number: [id number]    Admitting Physician: Dr. Gelacio Baldwin    Reason for Admission: End Stage Right Hip OA    History of Present Illness: 74 y.o. female presents with severe end stage hip osteoarthritis which has not been responsive to the full compliment of conservative measures. Despite conservative attempts, there is still severe, constant activity limiting hip pain. Given the severity of the pain, the patient has elected to proceed with hip replacement.    Allergies:   Allergies   Allergen Reactions    Penicillins Anaphylaxis    Sulfa Antibiotics Anaphylaxis         Current Medications:  Home Medications:    Current Outpatient Medications on File Prior to Visit   Medication Sig    albuterol sulfate  (90 Base) MCG/ACT inhaler Inhale 2 puffs Every 4 (Four) Hours As Needed (cough, chest tightness, wheezing).    anastrozole (ARIMIDEX) 1 MG tablet Take 1 tablet by mouth Daily.    atorvastatin (LIPITOR) 80 MG tablet Take 1 tablet by mouth Daily.    cetirizine (zyrTEC) 5 MG tablet Take 1 tablet by mouth Daily.    chlorthalidone (HYGROTON) 25 MG tablet Take 1 tablet by mouth Daily.    enalapril (VASOTEC) 20 MG tablet Take 2 tablets by mouth Daily.    levothyroxine (SYNTHROID, LEVOTHROID) 50 MCG tablet Take 1 tablet by mouth Every Morning.    meloxicam (MOBIC) 15 MG tablet Take 1 tablet by mouth Daily. (Patient taking differently: Take 1 tablet by mouth As Needed. HOLD FOR SURGERY)    predniSONE (DELTASONE) 20 MG tablet Take 2 tablets by mouth Daily for 5 days.     No current facility-administered medications on file prior to visit.     PRN Meds:.    PMH:  Past Medical History:   Diagnosis Date    Anxiety     Arthritis     Breast cancer 11/01/2019    dcis left    Chest congestion     Cough     Depression     GERD (gastroesophageal reflux disease)     Hip pain     Hip pain     HTN (hypertension)      "Hyperlipidemia     Hyperthyroidism     Osteopenia     Seasonal allergies     Slow to wake up after anesthesia         PSURGH:  Past Surgical History:   Procedure Laterality Date    ABDOMINOPLASTY      BREAST BIOPSY      BREAST LUMPECTOMY Left 2020       SocialHx:  Social History     Occupational History    Not on file   Tobacco Use    Smoking status: Never     Passive exposure: Never    Smokeless tobacco: Never   Vaping Use    Vaping status: Never Used   Substance and Sexual Activity    Alcohol use: Yes     Comment: 1 drink once a month    Drug use: Never    Sexual activity: Defer     Birth control/protection: I.U.D.      Social History     Social History Narrative    Not on file       FamHx:  Family History   Problem Relation Age of Onset    Stroke Mother     Hyperthyroidism Mother     Malig Hyperthermia Neg Hx          Review of Systems:   A 14 point review of systems was performed, pertinent positives discussed above, all other systems are negative    Physical Exam: 74 y.o. female  Vital Signs :   Vitals:    01/30/25 1548   BP: 164/82   Pulse: 75   Temp: 98.6 °F (37 °C)   SpO2: 96%   Weight: 69.2 kg (152 lb 9.6 oz)   Height: 154.9 cm (61\")     General: Alert and Oriented x 3, No acute distress.  Psych: mood and affect appropriate; recent and remote memory intact  Eyes: conjunctivae clear; pupils equally round and reactive, sclerae antiicteric  CV: RRR  Resp: normal respiratory effort  Skin: no rashes or wounds; normal turgor    Labs:    Pre-Admission Testing on 01/27/2025   Component Date Value Ref Range Status    Glucose 01/27/2025 105 (H)  65 - 99 mg/dL Final    BUN 01/27/2025 21  8 - 23 mg/dL Final    Creatinine 01/27/2025 0.98  0.57 - 1.00 mg/dL Final    Sodium 01/27/2025 138  136 - 145 mmol/L Final    Potassium 01/27/2025 4.1  3.5 - 5.2 mmol/L Final    Chloride 01/27/2025 99  98 - 107 mmol/L Final    CO2 01/27/2025 27.5  22.0 - 29.0 mmol/L Final    Calcium 01/27/2025 10.1  8.6 - 10.5 mg/dL Final    " BUN/Creatinine Ratio 01/27/2025 21.4  7.0 - 25.0 Final    Anion Gap 01/27/2025 11.5  5.0 - 15.0 mmol/L Final    eGFR 01/27/2025 61.1  >60.0 mL/min/1.73 Final    WBC 01/27/2025 8.51  3.40 - 10.80 10*3/mm3 Final    RBC 01/27/2025 3.86  3.77 - 5.28 10*6/mm3 Final    Hemoglobin 01/27/2025 12.0  12.0 - 15.9 g/dL Final    Hematocrit 01/27/2025 35.3  34.0 - 46.6 % Final    MCV 01/27/2025 91.5  79.0 - 97.0 fL Final    MCH 01/27/2025 31.1  26.6 - 33.0 pg Final    MCHC 01/27/2025 34.0  31.5 - 35.7 g/dL Final    RDW 01/27/2025 12.4  12.3 - 15.4 % Final    RDW-SD 01/27/2025 41.0  37.0 - 54.0 fl Final    MPV 01/27/2025 11.5  6.0 - 12.0 fL Final    Platelets 01/27/2025 235  140 - 450 10*3/mm3 Final    QT Interval 01/27/2025 411  ms Final    QTC Interval 01/27/2025 424  ms Final    ABO Type 01/27/2025 AB   Final    RH type 01/27/2025 Positive   Final    Antibody Screen 01/27/2025 Negative   Final    T&S Expiration Date 01/27/2025 2/10/2025 11:59:00 PM   Final     Xrays:  Xrays AP pelvis and a lateral of the Right hip were reviewed demonstrating  End stage hip OA with bone on bone articulation, subchondral cysts and periarticular osteophytes.    Assessment:  End-stage Right hip osteoarthritis. Conservative measures have failed.      Plan:  The plan is to proceed with Right Total Hip Replacement. The patient voiced understanding of the risks, benefits, and alternative forms of treatment that were discussed with Dr Baldwin at the time of scheduling.  23-hour home health    Marcie Caldwell, APRN  1/30/2025

## 2025-01-30 NOTE — H&P (VIEW-ONLY)
Patient: Olga Peres    Date of Admission: 2/10/2025    YOB: 1950    Medical Record Number: [id number]    Admitting Physician: Dr. Gelacio Baldwin    Reason for Admission: End Stage Right Hip OA    History of Present Illness: 74 y.o. female presents with severe end stage hip osteoarthritis which has not been responsive to the full compliment of conservative measures. Despite conservative attempts, there is still severe, constant activity limiting hip pain. Given the severity of the pain, the patient has elected to proceed with hip replacement.    Allergies:   Allergies   Allergen Reactions    Penicillins Anaphylaxis    Sulfa Antibiotics Anaphylaxis         Current Medications:  Home Medications:    Current Outpatient Medications on File Prior to Visit   Medication Sig    albuterol sulfate  (90 Base) MCG/ACT inhaler Inhale 2 puffs Every 4 (Four) Hours As Needed (cough, chest tightness, wheezing).    anastrozole (ARIMIDEX) 1 MG tablet Take 1 tablet by mouth Daily.    atorvastatin (LIPITOR) 80 MG tablet Take 1 tablet by mouth Daily.    cetirizine (zyrTEC) 5 MG tablet Take 1 tablet by mouth Daily.    chlorthalidone (HYGROTON) 25 MG tablet Take 1 tablet by mouth Daily.    enalapril (VASOTEC) 20 MG tablet Take 2 tablets by mouth Daily.    levothyroxine (SYNTHROID, LEVOTHROID) 50 MCG tablet Take 1 tablet by mouth Every Morning.    meloxicam (MOBIC) 15 MG tablet Take 1 tablet by mouth Daily. (Patient taking differently: Take 1 tablet by mouth As Needed. HOLD FOR SURGERY)    predniSONE (DELTASONE) 20 MG tablet Take 2 tablets by mouth Daily for 5 days.     No current facility-administered medications on file prior to visit.     PRN Meds:.    PMH:  Past Medical History:   Diagnosis Date    Anxiety     Arthritis     Breast cancer 11/01/2019    dcis left    Chest congestion     Cough     Depression     GERD (gastroesophageal reflux disease)     Hip pain     Hip pain     HTN (hypertension)      "Hyperlipidemia     Hyperthyroidism     Osteopenia     Seasonal allergies     Slow to wake up after anesthesia         PSURGH:  Past Surgical History:   Procedure Laterality Date    ABDOMINOPLASTY      BREAST BIOPSY      BREAST LUMPECTOMY Left 2020       SocialHx:  Social History     Occupational History    Not on file   Tobacco Use    Smoking status: Never     Passive exposure: Never    Smokeless tobacco: Never   Vaping Use    Vaping status: Never Used   Substance and Sexual Activity    Alcohol use: Yes     Comment: 1 drink once a month    Drug use: Never    Sexual activity: Defer     Birth control/protection: I.U.D.      Social History     Social History Narrative    Not on file       FamHx:  Family History   Problem Relation Age of Onset    Stroke Mother     Hyperthyroidism Mother     Malig Hyperthermia Neg Hx          Review of Systems:   A 14 point review of systems was performed, pertinent positives discussed above, all other systems are negative    Physical Exam: 74 y.o. female  Vital Signs :   Vitals:    01/30/25 1548   BP: 164/82   Pulse: 75   Temp: 98.6 °F (37 °C)   SpO2: 96%   Weight: 69.2 kg (152 lb 9.6 oz)   Height: 154.9 cm (61\")     General: Alert and Oriented x 3, No acute distress.  Psych: mood and affect appropriate; recent and remote memory intact  Eyes: conjunctivae clear; pupils equally round and reactive, sclerae antiicteric  CV: RRR  Resp: normal respiratory effort  Skin: no rashes or wounds; normal turgor    Labs:    Pre-Admission Testing on 01/27/2025   Component Date Value Ref Range Status    Glucose 01/27/2025 105 (H)  65 - 99 mg/dL Final    BUN 01/27/2025 21  8 - 23 mg/dL Final    Creatinine 01/27/2025 0.98  0.57 - 1.00 mg/dL Final    Sodium 01/27/2025 138  136 - 145 mmol/L Final    Potassium 01/27/2025 4.1  3.5 - 5.2 mmol/L Final    Chloride 01/27/2025 99  98 - 107 mmol/L Final    CO2 01/27/2025 27.5  22.0 - 29.0 mmol/L Final    Calcium 01/27/2025 10.1  8.6 - 10.5 mg/dL Final    " BUN/Creatinine Ratio 01/27/2025 21.4  7.0 - 25.0 Final    Anion Gap 01/27/2025 11.5  5.0 - 15.0 mmol/L Final    eGFR 01/27/2025 61.1  >60.0 mL/min/1.73 Final    WBC 01/27/2025 8.51  3.40 - 10.80 10*3/mm3 Final    RBC 01/27/2025 3.86  3.77 - 5.28 10*6/mm3 Final    Hemoglobin 01/27/2025 12.0  12.0 - 15.9 g/dL Final    Hematocrit 01/27/2025 35.3  34.0 - 46.6 % Final    MCV 01/27/2025 91.5  79.0 - 97.0 fL Final    MCH 01/27/2025 31.1  26.6 - 33.0 pg Final    MCHC 01/27/2025 34.0  31.5 - 35.7 g/dL Final    RDW 01/27/2025 12.4  12.3 - 15.4 % Final    RDW-SD 01/27/2025 41.0  37.0 - 54.0 fl Final    MPV 01/27/2025 11.5  6.0 - 12.0 fL Final    Platelets 01/27/2025 235  140 - 450 10*3/mm3 Final    QT Interval 01/27/2025 411  ms Final    QTC Interval 01/27/2025 424  ms Final    ABO Type 01/27/2025 AB   Final    RH type 01/27/2025 Positive   Final    Antibody Screen 01/27/2025 Negative   Final    T&S Expiration Date 01/27/2025 2/10/2025 11:59:00 PM   Final     Xrays:  Xrays AP pelvis and a lateral of the Right hip were reviewed demonstrating  End stage hip OA with bone on bone articulation, subchondral cysts and periarticular osteophytes.    Assessment:  End-stage Right hip osteoarthritis. Conservative measures have failed.      Plan:  The plan is to proceed with Right Total Hip Replacement. The patient voiced understanding of the risks, benefits, and alternative forms of treatment that were discussed with Dr Baldwin at the time of scheduling.  23-hour home health    Marcie Caldwell, APRN  1/30/2025

## 2025-02-07 ENCOUNTER — TELEPHONE (OUTPATIENT)
Dept: ORTHOPEDIC SURGERY | Facility: CLINIC | Age: 75
End: 2025-02-07
Payer: MEDICARE

## 2025-02-10 ENCOUNTER — APPOINTMENT (OUTPATIENT)
Dept: GENERAL RADIOLOGY | Facility: HOSPITAL | Age: 75
End: 2025-02-10
Payer: MEDICARE

## 2025-02-10 ENCOUNTER — ANESTHESIA (OUTPATIENT)
Dept: PERIOP | Facility: HOSPITAL | Age: 75
End: 2025-02-10
Payer: MEDICARE

## 2025-02-10 ENCOUNTER — ANESTHESIA EVENT (OUTPATIENT)
Dept: PERIOP | Facility: HOSPITAL | Age: 75
End: 2025-02-10
Payer: MEDICARE

## 2025-02-10 ENCOUNTER — HOSPITAL ENCOUNTER (OUTPATIENT)
Facility: HOSPITAL | Age: 75
Discharge: HOME HEALTH | End: 2025-02-12
Attending: ORTHOPAEDIC SURGERY | Admitting: ORTHOPAEDIC SURGERY
Payer: MEDICARE

## 2025-02-10 DIAGNOSIS — M16.11 PRIMARY OSTEOARTHRITIS OF RIGHT HIP: ICD-10-CM

## 2025-02-10 DIAGNOSIS — Z96.641 STATUS POST TOTAL HIP REPLACEMENT, RIGHT: Primary | ICD-10-CM

## 2025-02-10 PROCEDURE — 25010000002 ROPIVACAINE PER 1 MG: Performed by: ORTHOPAEDIC SURGERY

## 2025-02-10 PROCEDURE — 25010000002 KETOROLAC TROMETHAMINE PER 15 MG: Performed by: ORTHOPAEDIC SURGERY

## 2025-02-10 PROCEDURE — C1776 JOINT DEVICE (IMPLANTABLE): HCPCS | Performed by: ORTHOPAEDIC SURGERY

## 2025-02-10 PROCEDURE — 25010000002 MAGNESIUM SULFATE PER 500 MG OF MAGNESIUM: Performed by: STUDENT IN AN ORGANIZED HEALTH CARE EDUCATION/TRAINING PROGRAM

## 2025-02-10 PROCEDURE — 25010000002 SUGAMMADEX 200 MG/2ML SOLUTION: Performed by: STUDENT IN AN ORGANIZED HEALTH CARE EDUCATION/TRAINING PROGRAM

## 2025-02-10 PROCEDURE — 25810000003 SODIUM CHLORIDE 0.9 % SOLUTION 250 ML FLEX CONT: Performed by: ORTHOPAEDIC SURGERY

## 2025-02-10 PROCEDURE — 25010000002 ONDANSETRON PER 1 MG: Performed by: STUDENT IN AN ORGANIZED HEALTH CARE EDUCATION/TRAINING PROGRAM

## 2025-02-10 PROCEDURE — 25010000002 ACETAMINOPHEN 10 MG/ML SOLUTION

## 2025-02-10 PROCEDURE — 25010000002 CLINDAMYCIN 900 MG/50ML SOLUTION: Performed by: ORTHOPAEDIC SURGERY

## 2025-02-10 PROCEDURE — 27130 TOTAL HIP ARTHROPLASTY: CPT | Performed by: ORTHOPAEDIC SURGERY

## 2025-02-10 PROCEDURE — 25010000002 PROPOFOL 10 MG/ML EMULSION

## 2025-02-10 PROCEDURE — 25010000002 VANCOMYCIN 1 G RECONSTITUTED SOLUTION 1 EACH VIAL: Performed by: ORTHOPAEDIC SURGERY

## 2025-02-10 PROCEDURE — 25010000002 GLYCOPYRROLATE 1 MG/5ML SOLUTION

## 2025-02-10 PROCEDURE — G0378 HOSPITAL OBSERVATION PER HR: HCPCS

## 2025-02-10 PROCEDURE — 25010000002 DEXAMETHASONE SODIUM PHOSPHATE 20 MG/5ML SOLUTION

## 2025-02-10 PROCEDURE — 76000 FLUOROSCOPY <1 HR PHYS/QHP: CPT

## 2025-02-10 PROCEDURE — 25010000002 FENTANYL CITRATE (PF) 50 MCG/ML SOLUTION

## 2025-02-10 PROCEDURE — C1713 ANCHOR/SCREW BN/BN,TIS/BN: HCPCS | Performed by: ORTHOPAEDIC SURGERY

## 2025-02-10 PROCEDURE — 25010000002 LIDOCAINE PF 2% 2 % SOLUTION

## 2025-02-10 PROCEDURE — 25010000002 MIDAZOLAM PER 1 MG: Performed by: ANESTHESIOLOGY

## 2025-02-10 PROCEDURE — 25010000002 MORPHINE PER 10 MG: Performed by: ORTHOPAEDIC SURGERY

## 2025-02-10 PROCEDURE — 25010000002 HYDROMORPHONE PER 4 MG: Performed by: NURSE ANESTHETIST, CERTIFIED REGISTERED

## 2025-02-10 PROCEDURE — 25810000003 LACTATED RINGERS SOLUTION: Performed by: ORTHOPAEDIC SURGERY

## 2025-02-10 PROCEDURE — 73501 X-RAY EXAM HIP UNI 1 VIEW: CPT

## 2025-02-10 PROCEDURE — 25010000002 EPINEPHRINE 1 MG/ML SOLUTION 30 ML VIAL: Performed by: ORTHOPAEDIC SURGERY

## 2025-02-10 PROCEDURE — 25010000002 CLINDAMYCIN 900 MG/50ML SOLUTION: Performed by: NURSE PRACTITIONER

## 2025-02-10 PROCEDURE — 25810000003 LACTATED RINGERS PER 1000 ML: Performed by: ANESTHESIOLOGY

## 2025-02-10 DEVICE — DEV WND/CLS CONTRL TISS STRATAFIX SPIRAL PDS PLS CT1 0 30CM: Type: IMPLANTABLE DEVICE | Site: HIP | Status: FUNCTIONAL

## 2025-02-10 DEVICE — R3 3 HOLE ACETABULAR SHELL 52MM
Type: IMPLANTABLE DEVICE | Site: HIP | Status: FUNCTIONAL
Brand: R3 ACETABULAR

## 2025-02-10 DEVICE — DEV CONTRL TISS STRATAFIX SPIRAL MNCRYL UD 3/0 PLS 30CM: Type: IMPLANTABLE DEVICE | Site: HIP | Status: FUNCTIONAL

## 2025-02-10 DEVICE — IMPLANTABLE DEVICE: Type: IMPLANTABLE DEVICE | Status: FUNCTIONAL

## 2025-02-10 DEVICE — R3 0 DEGREE XLPE ACETABULAR LINER                                    36MM INNER DIAMETER X OUTER DIAMETER 52MM
Type: IMPLANTABLE DEVICE | Site: HIP | Status: FUNCTIONAL
Brand: R3

## 2025-02-10 DEVICE — REFLECTION SPHERICAL HEAD SCREW 25MM
Type: IMPLANTABLE DEVICE | Site: HIP | Status: FUNCTIONAL
Brand: REFLECTION

## 2025-02-10 DEVICE — OXINIUM FEMORAL HEAD 12/14 TAPER                                    36 MM +0
Type: IMPLANTABLE DEVICE | Site: HIP | Status: FUNCTIONAL
Brand: OXINIUM

## 2025-02-10 DEVICE — POLARSTEM STANDARD NON-CEMENTED                                    WITH TI/HA 2
Type: IMPLANTABLE DEVICE | Site: HIP | Status: FUNCTIONAL
Brand: POLARSTEM

## 2025-02-10 RX ORDER — LABETALOL HYDROCHLORIDE 5 MG/ML
5 INJECTION, SOLUTION INTRAVENOUS
Status: DISCONTINUED | OUTPATIENT
Start: 2025-02-10 | End: 2025-02-10 | Stop reason: HOSPADM

## 2025-02-10 RX ORDER — ASPIRIN 81 MG/1
81 TABLET ORAL EVERY 12 HOURS SCHEDULED
Status: DISCONTINUED | OUTPATIENT
Start: 2025-02-11 | End: 2025-02-12 | Stop reason: HOSPADM

## 2025-02-10 RX ORDER — MIDAZOLAM HYDROCHLORIDE 1 MG/ML
0.5 INJECTION, SOLUTION INTRAMUSCULAR; INTRAVENOUS
Status: DISCONTINUED | OUTPATIENT
Start: 2025-02-10 | End: 2025-02-10 | Stop reason: HOSPADM

## 2025-02-10 RX ORDER — HYDROMORPHONE HYDROCHLORIDE 1 MG/ML
0.5 INJECTION, SOLUTION INTRAMUSCULAR; INTRAVENOUS; SUBCUTANEOUS
Status: DISCONTINUED | OUTPATIENT
Start: 2025-02-10 | End: 2025-02-10 | Stop reason: HOSPADM

## 2025-02-10 RX ORDER — TRANEXAMIC ACID 100 MG/ML
INJECTION, SOLUTION INTRAVENOUS AS NEEDED
Status: DISCONTINUED | OUTPATIENT
Start: 2025-02-10 | End: 2025-02-10 | Stop reason: SURG

## 2025-02-10 RX ORDER — ONDANSETRON 2 MG/ML
4 INJECTION INTRAMUSCULAR; INTRAVENOUS ONCE AS NEEDED
Status: DISCONTINUED | OUTPATIENT
Start: 2025-02-10 | End: 2025-02-12 | Stop reason: HOSPADM

## 2025-02-10 RX ORDER — IPRATROPIUM BROMIDE AND ALBUTEROL SULFATE 2.5; .5 MG/3ML; MG/3ML
3 SOLUTION RESPIRATORY (INHALATION) ONCE AS NEEDED
Status: DISCONTINUED | OUTPATIENT
Start: 2025-02-10 | End: 2025-02-10 | Stop reason: HOSPADM

## 2025-02-10 RX ORDER — HYDROCODONE BITARTRATE AND ACETAMINOPHEN 7.5; 325 MG/1; MG/1
1 TABLET ORAL EVERY 4 HOURS PRN
Status: DISCONTINUED | OUTPATIENT
Start: 2025-02-10 | End: 2025-02-12 | Stop reason: HOSPADM

## 2025-02-10 RX ORDER — ALBUTEROL SULFATE 0.83 MG/ML
2.5 SOLUTION RESPIRATORY (INHALATION) EVERY 4 HOURS PRN
Status: DISCONTINUED | OUTPATIENT
Start: 2025-02-10 | End: 2025-02-12 | Stop reason: HOSPADM

## 2025-02-10 RX ORDER — LEVOTHYROXINE SODIUM 50 UG/1
50 TABLET ORAL
Status: DISCONTINUED | OUTPATIENT
Start: 2025-02-11 | End: 2025-02-12 | Stop reason: HOSPADM

## 2025-02-10 RX ORDER — ROCURONIUM BROMIDE 10 MG/ML
INJECTION, SOLUTION INTRAVENOUS AS NEEDED
Status: DISCONTINUED | OUTPATIENT
Start: 2025-02-10 | End: 2025-02-10 | Stop reason: SURG

## 2025-02-10 RX ORDER — ONDANSETRON 2 MG/ML
4 INJECTION INTRAMUSCULAR; INTRAVENOUS ONCE AS NEEDED
Status: DISCONTINUED | OUTPATIENT
Start: 2025-02-10 | End: 2025-02-10 | Stop reason: HOSPADM

## 2025-02-10 RX ORDER — PROMETHAZINE HYDROCHLORIDE 25 MG/1
25 SUPPOSITORY RECTAL ONCE AS NEEDED
Status: DISCONTINUED | OUTPATIENT
Start: 2025-02-10 | End: 2025-02-10 | Stop reason: HOSPADM

## 2025-02-10 RX ORDER — PREGABALIN 75 MG/1
150 CAPSULE ORAL ONCE
Status: COMPLETED | OUTPATIENT
Start: 2025-02-10 | End: 2025-02-10

## 2025-02-10 RX ORDER — HYDRALAZINE HYDROCHLORIDE 20 MG/ML
5 INJECTION INTRAMUSCULAR; INTRAVENOUS
Status: DISCONTINUED | OUTPATIENT
Start: 2025-02-10 | End: 2025-02-10 | Stop reason: HOSPADM

## 2025-02-10 RX ORDER — ENALAPRIL MALEATE 20 MG/1
40 TABLET ORAL DAILY
Status: DISCONTINUED | OUTPATIENT
Start: 2025-02-11 | End: 2025-02-12 | Stop reason: HOSPADM

## 2025-02-10 RX ORDER — ATORVASTATIN CALCIUM 80 MG/1
80 TABLET, FILM COATED ORAL DAILY
Status: DISCONTINUED | OUTPATIENT
Start: 2025-02-11 | End: 2025-02-12 | Stop reason: HOSPADM

## 2025-02-10 RX ORDER — CLINDAMYCIN PHOSPHATE 900 MG/50ML
900 INJECTION, SOLUTION INTRAVENOUS EVERY 8 HOURS
Status: COMPLETED | OUTPATIENT
Start: 2025-02-10 | End: 2025-02-11

## 2025-02-10 RX ORDER — ANASTROZOLE 1 MG/1
1 TABLET ORAL DAILY
Status: DISCONTINUED | OUTPATIENT
Start: 2025-02-11 | End: 2025-02-12 | Stop reason: HOSPADM

## 2025-02-10 RX ORDER — OXYCODONE AND ACETAMINOPHEN 7.5; 325 MG/1; MG/1
1 TABLET ORAL EVERY 4 HOURS PRN
Status: DISCONTINUED | OUTPATIENT
Start: 2025-02-10 | End: 2025-02-10 | Stop reason: HOSPADM

## 2025-02-10 RX ORDER — SODIUM CHLORIDE, SODIUM LACTATE, POTASSIUM CHLORIDE, CALCIUM CHLORIDE 600; 310; 30; 20 MG/100ML; MG/100ML; MG/100ML; MG/100ML
9 INJECTION, SOLUTION INTRAVENOUS CONTINUOUS
Status: DISCONTINUED | OUTPATIENT
Start: 2025-02-10 | End: 2025-02-10

## 2025-02-10 RX ORDER — KETAMINE HCL IN NACL, ISO-OSM 100MG/10ML
SYRINGE (ML) INJECTION AS NEEDED
Status: DISCONTINUED | OUTPATIENT
Start: 2025-02-10 | End: 2025-02-10 | Stop reason: SURG

## 2025-02-10 RX ORDER — MELOXICAM 15 MG/1
15 TABLET ORAL DAILY
Qty: 14 TABLET | Refills: 0 | Status: SHIPPED | OUTPATIENT
Start: 2025-02-10

## 2025-02-10 RX ORDER — PROMETHAZINE HYDROCHLORIDE 12.5 MG/1
12.5 TABLET ORAL EVERY 4 HOURS PRN
Status: DISCONTINUED | OUTPATIENT
Start: 2025-02-10 | End: 2025-02-12 | Stop reason: HOSPADM

## 2025-02-10 RX ORDER — LIDOCAINE HYDROCHLORIDE 10 MG/ML
0.5 INJECTION, SOLUTION INFILTRATION; PERINEURAL ONCE AS NEEDED
Status: DISCONTINUED | OUTPATIENT
Start: 2025-02-10 | End: 2025-02-10 | Stop reason: HOSPADM

## 2025-02-10 RX ORDER — HYDROCODONE BITARTRATE AND ACETAMINOPHEN 5; 325 MG/1; MG/1
1 TABLET ORAL ONCE AS NEEDED
Status: DISCONTINUED | OUTPATIENT
Start: 2025-02-10 | End: 2025-02-10 | Stop reason: HOSPADM

## 2025-02-10 RX ORDER — CHLORTHALIDONE 25 MG/1
25 TABLET ORAL DAILY
Status: DISCONTINUED | OUTPATIENT
Start: 2025-02-11 | End: 2025-02-12 | Stop reason: HOSPADM

## 2025-02-10 RX ORDER — FENTANYL CITRATE 50 UG/ML
50 INJECTION, SOLUTION INTRAMUSCULAR; INTRAVENOUS
Status: DISCONTINUED | OUTPATIENT
Start: 2025-02-10 | End: 2025-02-10 | Stop reason: HOSPADM

## 2025-02-10 RX ORDER — ONDANSETRON 4 MG/1
4 TABLET, ORALLY DISINTEGRATING ORAL EVERY 6 HOURS PRN
Status: DISCONTINUED | OUTPATIENT
Start: 2025-02-10 | End: 2025-02-12 | Stop reason: HOSPADM

## 2025-02-10 RX ORDER — LIDOCAINE HYDROCHLORIDE 20 MG/ML
INJECTION, SOLUTION EPIDURAL; INFILTRATION; INTRACAUDAL; PERINEURAL AS NEEDED
Status: DISCONTINUED | OUTPATIENT
Start: 2025-02-10 | End: 2025-02-10 | Stop reason: SURG

## 2025-02-10 RX ORDER — FAMOTIDINE 10 MG/ML
20 INJECTION, SOLUTION INTRAVENOUS ONCE
Status: COMPLETED | OUTPATIENT
Start: 2025-02-10 | End: 2025-02-10

## 2025-02-10 RX ORDER — HYDROCODONE BITARTRATE AND ACETAMINOPHEN 7.5; 325 MG/1; MG/1
2 TABLET ORAL EVERY 4 HOURS PRN
Status: DISCONTINUED | OUTPATIENT
Start: 2025-02-10 | End: 2025-02-12 | Stop reason: HOSPADM

## 2025-02-10 RX ORDER — POLYETHYLENE GLYCOL 3350 17 G/17G
17 POWDER, FOR SOLUTION ORAL 2 TIMES DAILY
Qty: 238 G | Refills: 0 | Status: SHIPPED | OUTPATIENT
Start: 2025-02-10 | End: 2025-02-19

## 2025-02-10 RX ORDER — HYDROCODONE BITARTRATE AND ACETAMINOPHEN 7.5; 325 MG/1; MG/1
1 TABLET ORAL EVERY 4 HOURS PRN
Qty: 40 TABLET | Refills: 0 | Status: SHIPPED | OUTPATIENT
Start: 2025-02-10

## 2025-02-10 RX ORDER — PROPOFOL 10 MG/ML
VIAL (ML) INTRAVENOUS AS NEEDED
Status: DISCONTINUED | OUTPATIENT
Start: 2025-02-10 | End: 2025-02-10 | Stop reason: SURG

## 2025-02-10 RX ORDER — CETIRIZINE HYDROCHLORIDE 10 MG/1
5 TABLET ORAL DAILY
Status: DISCONTINUED | OUTPATIENT
Start: 2025-02-11 | End: 2025-02-12 | Stop reason: HOSPADM

## 2025-02-10 RX ORDER — FLUMAZENIL 0.1 MG/ML
0.2 INJECTION INTRAVENOUS AS NEEDED
Status: DISCONTINUED | OUTPATIENT
Start: 2025-02-10 | End: 2025-02-10 | Stop reason: HOSPADM

## 2025-02-10 RX ORDER — EPHEDRINE SULFATE 50 MG/ML
5 INJECTION, SOLUTION INTRAVENOUS ONCE AS NEEDED
Status: DISCONTINUED | OUTPATIENT
Start: 2025-02-10 | End: 2025-02-10 | Stop reason: HOSPADM

## 2025-02-10 RX ORDER — ASPIRIN 81 MG/1
TABLET ORAL
Qty: 60 TABLET | Refills: 0 | Status: SHIPPED | OUTPATIENT
Start: 2025-02-10

## 2025-02-10 RX ORDER — ACETAMINOPHEN 325 MG/1
650 TABLET ORAL EVERY 6 HOURS PRN
Status: DISCONTINUED | OUTPATIENT
Start: 2025-02-10 | End: 2025-02-12 | Stop reason: HOSPADM

## 2025-02-10 RX ORDER — MAGNESIUM HYDROXIDE 1200 MG/15ML
LIQUID ORAL AS NEEDED
Status: DISCONTINUED | OUTPATIENT
Start: 2025-02-10 | End: 2025-02-10 | Stop reason: HOSPADM

## 2025-02-10 RX ORDER — ACETAMINOPHEN 10 MG/ML
INJECTION, SOLUTION INTRAVENOUS AS NEEDED
Status: DISCONTINUED | OUTPATIENT
Start: 2025-02-10 | End: 2025-02-10 | Stop reason: SURG

## 2025-02-10 RX ORDER — NALOXONE HCL 0.4 MG/ML
0.2 VIAL (ML) INJECTION AS NEEDED
Status: DISCONTINUED | OUTPATIENT
Start: 2025-02-10 | End: 2025-02-10 | Stop reason: HOSPADM

## 2025-02-10 RX ORDER — ONDANSETRON 2 MG/ML
INJECTION INTRAMUSCULAR; INTRAVENOUS AS NEEDED
Status: DISCONTINUED | OUTPATIENT
Start: 2025-02-10 | End: 2025-02-10 | Stop reason: SURG

## 2025-02-10 RX ORDER — PROMETHAZINE HYDROCHLORIDE 25 MG/1
25 TABLET ORAL ONCE AS NEEDED
Status: DISCONTINUED | OUTPATIENT
Start: 2025-02-10 | End: 2025-02-10 | Stop reason: HOSPADM

## 2025-02-10 RX ORDER — MAGNESIUM SULFATE HEPTAHYDRATE 500 MG/ML
INJECTION, SOLUTION INTRAMUSCULAR; INTRAVENOUS AS NEEDED
Status: DISCONTINUED | OUTPATIENT
Start: 2025-02-10 | End: 2025-02-10 | Stop reason: SURG

## 2025-02-10 RX ORDER — SODIUM CHLORIDE 0.9 % (FLUSH) 0.9 %
3-10 SYRINGE (ML) INJECTION AS NEEDED
Status: DISCONTINUED | OUTPATIENT
Start: 2025-02-10 | End: 2025-02-10 | Stop reason: HOSPADM

## 2025-02-10 RX ORDER — CLINDAMYCIN PHOSPHATE 900 MG/50ML
900 INJECTION, SOLUTION INTRAVENOUS ONCE
Status: COMPLETED | OUTPATIENT
Start: 2025-02-10 | End: 2025-02-10

## 2025-02-10 RX ORDER — MELOXICAM 15 MG/1
15 TABLET ORAL ONCE
Status: COMPLETED | OUTPATIENT
Start: 2025-02-10 | End: 2025-02-10

## 2025-02-10 RX ORDER — MELOXICAM 15 MG/1
15 TABLET ORAL DAILY
Status: DISCONTINUED | OUTPATIENT
Start: 2025-02-11 | End: 2025-02-12 | Stop reason: HOSPADM

## 2025-02-10 RX ORDER — FENTANYL CITRATE 50 UG/ML
50 INJECTION, SOLUTION INTRAMUSCULAR; INTRAVENOUS ONCE AS NEEDED
Status: DISCONTINUED | OUTPATIENT
Start: 2025-02-10 | End: 2025-02-10 | Stop reason: HOSPADM

## 2025-02-10 RX ORDER — DEXAMETHASONE SODIUM PHOSPHATE 4 MG/ML
INJECTION, SOLUTION INTRA-ARTICULAR; INTRALESIONAL; INTRAMUSCULAR; INTRAVENOUS; SOFT TISSUE AS NEEDED
Status: DISCONTINUED | OUTPATIENT
Start: 2025-02-10 | End: 2025-02-10 | Stop reason: SURG

## 2025-02-10 RX ORDER — PANTOPRAZOLE SODIUM 40 MG/1
40 TABLET, DELAYED RELEASE ORAL DAILY
Qty: 14 TABLET | Refills: 0 | Status: SHIPPED | OUTPATIENT
Start: 2025-02-10 | End: 2025-02-26

## 2025-02-10 RX ORDER — ATROPINE SULFATE 0.4 MG/ML
0.4 INJECTION, SOLUTION INTRAMUSCULAR; INTRAVENOUS; SUBCUTANEOUS ONCE AS NEEDED
Status: DISCONTINUED | OUTPATIENT
Start: 2025-02-10 | End: 2025-02-10 | Stop reason: HOSPADM

## 2025-02-10 RX ORDER — SODIUM CHLORIDE 0.9 % (FLUSH) 0.9 %
3 SYRINGE (ML) INJECTION EVERY 12 HOURS SCHEDULED
Status: DISCONTINUED | OUTPATIENT
Start: 2025-02-10 | End: 2025-02-10 | Stop reason: HOSPADM

## 2025-02-10 RX ORDER — GLYCOPYRROLATE 0.2 MG/ML
INJECTION INTRAMUSCULAR; INTRAVENOUS AS NEEDED
Status: DISCONTINUED | OUTPATIENT
Start: 2025-02-10 | End: 2025-02-10 | Stop reason: SURG

## 2025-02-10 RX ORDER — ONDANSETRON 4 MG/1
4 TABLET, FILM COATED ORAL EVERY 8 HOURS PRN
Qty: 10 TABLET | Refills: 0 | Status: SHIPPED | OUTPATIENT
Start: 2025-02-10

## 2025-02-10 RX ORDER — FENTANYL CITRATE 50 UG/ML
INJECTION, SOLUTION INTRAMUSCULAR; INTRAVENOUS AS NEEDED
Status: DISCONTINUED | OUTPATIENT
Start: 2025-02-10 | End: 2025-02-10 | Stop reason: SURG

## 2025-02-10 RX ORDER — DIPHENHYDRAMINE HYDROCHLORIDE 50 MG/ML
12.5 INJECTION INTRAMUSCULAR; INTRAVENOUS
Status: DISCONTINUED | OUTPATIENT
Start: 2025-02-10 | End: 2025-02-10 | Stop reason: HOSPADM

## 2025-02-10 RX ADMIN — ACETAMINOPHEN 1000 MG: 1000 INJECTION INTRAVENOUS at 15:10

## 2025-02-10 RX ADMIN — CLINDAMYCIN PHOSPHATE 900 MG: 900 INJECTION, SOLUTION INTRAVENOUS at 23:10

## 2025-02-10 RX ADMIN — PROPOFOL 150 MG: 10 INJECTION, EMULSION INTRAVENOUS at 13:55

## 2025-02-10 RX ADMIN — ACETAMINOPHEN 1000 MG: 1000 INJECTION INTRAVENOUS at 13:53

## 2025-02-10 RX ADMIN — MIDAZOLAM 0.5 MG: 1 INJECTION INTRAMUSCULAR; INTRAVENOUS at 13:08

## 2025-02-10 RX ADMIN — LIDOCAINE HYDROCHLORIDE 100 MG: 20 INJECTION, SOLUTION EPIDURAL; INFILTRATION; INTRACAUDAL; PERINEURAL at 13:55

## 2025-02-10 RX ADMIN — MAGNESIUM SULFATE HEPTAHYDRATE 2 G: 500 INJECTION, SOLUTION INTRAMUSCULAR; INTRAVENOUS at 14:47

## 2025-02-10 RX ADMIN — VANCOMYCIN HYDROCHLORIDE 1000 MG: 1 INJECTION, POWDER, LYOPHILIZED, FOR SOLUTION INTRAVENOUS at 13:02

## 2025-02-10 RX ADMIN — ROCURONIUM BROMIDE 40 MG: 10 INJECTION, SOLUTION INTRAVENOUS at 13:56

## 2025-02-10 RX ADMIN — TRANEXAMIC ACID 1000 MG: 100 INJECTION, SOLUTION INTRAVENOUS at 14:00

## 2025-02-10 RX ADMIN — PREGABALIN 75 MG: 75 CAPSULE ORAL at 11:15

## 2025-02-10 RX ADMIN — ROCURONIUM BROMIDE 10 MG: 10 INJECTION, SOLUTION INTRAVENOUS at 14:22

## 2025-02-10 RX ADMIN — Medication 10 MG: at 14:22

## 2025-02-10 RX ADMIN — DEXAMETHASONE SODIUM PHOSPHATE 10 MG: 4 INJECTION, SOLUTION INTRAMUSCULAR; INTRAVENOUS at 14:00

## 2025-02-10 RX ADMIN — MELOXICAM 15 MG: 15 TABLET ORAL at 13:05

## 2025-02-10 RX ADMIN — Medication 40 MG: at 14:00

## 2025-02-10 RX ADMIN — HYDROMORPHONE HYDROCHLORIDE 0.5 MG: 1 INJECTION, SOLUTION INTRAMUSCULAR; INTRAVENOUS; SUBCUTANEOUS at 17:15

## 2025-02-10 RX ADMIN — GLYCOPYRROLATE 0.1 MG: 0.2 INJECTION INTRAMUSCULAR; INTRAVENOUS at 14:00

## 2025-02-10 RX ADMIN — FAMOTIDINE 20 MG: 10 INJECTION INTRAVENOUS at 12:48

## 2025-02-10 RX ADMIN — PROPOFOL 125 MCG/KG/MIN: 10 INJECTION, EMULSION INTRAVENOUS at 14:00

## 2025-02-10 RX ADMIN — SODIUM CHLORIDE, POTASSIUM CHLORIDE, SODIUM LACTATE AND CALCIUM CHLORIDE 500 ML: 600; 310; 30; 20 INJECTION, SOLUTION INTRAVENOUS at 11:08

## 2025-02-10 RX ADMIN — FENTANYL CITRATE 50 MCG: 50 INJECTION, SOLUTION INTRAMUSCULAR; INTRAVENOUS at 13:55

## 2025-02-10 RX ADMIN — CLINDAMYCIN PHOSPHATE 900 MG: 900 INJECTION, SOLUTION INTRAVENOUS at 13:44

## 2025-02-10 RX ADMIN — FENTANYL CITRATE 50 MCG: 50 INJECTION, SOLUTION INTRAMUSCULAR; INTRAVENOUS at 14:30

## 2025-02-10 RX ADMIN — SUGAMMADEX 200 MG: 100 INJECTION, SOLUTION INTRAVENOUS at 15:30

## 2025-02-10 RX ADMIN — ONDANSETRON 4 MG: 2 INJECTION INTRAMUSCULAR; INTRAVENOUS at 15:20

## 2025-02-10 RX ADMIN — SODIUM CHLORIDE, POTASSIUM CHLORIDE, SODIUM LACTATE AND CALCIUM CHLORIDE: 600; 310; 30; 20 INJECTION, SOLUTION INTRAVENOUS at 13:50

## 2025-02-10 NOTE — ANESTHESIA POSTPROCEDURE EVALUATION
Patient: Olga Peres    Procedure Summary       Date: 02/10/25 Room / Location:  [facility] [address] [place] [address] [facility]    Anesthesia Start: 1349 Anesthesia Stop: 1546    Procedure: TOTAL HIP ARTHROPLASTY ANTERIOR WITH HANA TABLE (Right: Hip) Diagnosis:       Primary osteoarthritis of right hip      (Primary osteoarthritis of right hip [M16.11])    Surgeons: Gelacio Baldwin MD Provider: Keisha Galvan MD    Anesthesia Type: general ASA Status: 2            Anesthesia Type: general    Vitals  Vitals Value Taken Time   /77 02/10/25 1800   Temp 36.6 °C (97.8 °F) 02/10/25 1545   Pulse 69 02/10/25 1813   Resp 16 02/10/25 1800   SpO2 100 % 02/10/25 1813   Vitals shown include unfiled device data.        Anesthesia Post Evaluation

## 2025-02-10 NOTE — ANESTHESIA PROCEDURE NOTES
Airway  Urgency: elective    Date/Time: 2/10/2025 1:59 PM  Airway not difficult    General Information and Staff    Patient location during procedure: OR  Anesthesiologist: Keisha Galvan MD  CRNA/CAA: Arminda Holguin CRNA    Indications and Patient Condition  Indications for airway management: airway protection    Preoxygenated: yes  MILS not maintained throughout  Mask difficulty assessment: 1 - vent by mask    Final Airway Details  Final airway type: endotracheal airway      Successful airway: ETT  Cuffed: yes   Successful intubation technique: direct laryngoscopy  Facilitating devices/methods: intubating stylet and cricoid pressure  Endotracheal tube insertion site: oral  Blade: Andriy  Blade size: 3  ETT size (mm): 7.5  Cormack-Lehane Classification: grade I - full view of glottis  Placement verified by: chest auscultation and capnometry   Cuff volume (mL): 8  Measured from: lips  ETT/EBT  to lips (cm): 19  Number of attempts at approach: 1  Assessment: lips, teeth, and gum same as pre-op and atraumatic intubation    Additional Comments  Airway exam prior to DL, teeth/lips inspected. Preoxygenated with 100% O2; sniffing position, easy mask ventilation. Eyes taped. Atraumatic intubation. Lips and teeth intact, no damage. ETT connected to vent. Confirmed EBBS, +EtCO2.

## 2025-02-10 NOTE — SIGNIFICANT NOTE
"1615 patient was crying and upset when blood pressure measured. Patient was just waking and thought she had \"been bad\" and the surgery wasn't done. Via  assured patient surgery was done and went well and everything was okay. Patient calmed and rested.   "

## 2025-02-10 NOTE — OP NOTE
...............Name: Olga Peres  YOB: 1950    DATE OF SURGERY: 2/10/2025    PREOPERATIVE DIAGNOSIS: Right hip end-stage osteoarthritis    POSTOPERATIVE DIAGNOSIS: Right hip end-stage osteoarthritis    PROCEDURE PERFORMED: Right anterior total hip replacement    SURGEON: Gelacio Baldwin M.D.    ASSISTANT: RAYMOND HELTON    A surgical assistant was integral in ensuring a successful outcome with this procedure.  The assistant was utilized to assist in positioning the patient, draping the patient, was used throughout the case to provide with retraction of tissues, suctioning of blood and body fluids for visualization, positioning of the extremity to allow for proper exposure so that I could perform the procedure.  Without the use of a surgical assistant during this procedure I feel that the outcome may have been compromised or would have been suboptimal or at risk for complications.    IMPLANTS: [employer] Polar stem, R3 cup:  Implant Name Type Inv. Item Serial No.  Lot No. LRB No. Used Action   DEV CONTRL TISS STRATAFIX SPIRAL MNCRYL UD 3/0 PLS 30CM - [id number] Implant DEV CONTRL TISS STRATAFIX SPIRAL MNCRYL UD 3/0 PLS 30CM  [employer]  DIV OF J AND J [id number] Right 1 Implanted   DEV WND/CLS CONTRL TISS STRATAFIX SPIRAL PDS PLS CT1 0 30CM - [id number] Implant DEV WND/CLS CONTRL TISS STRATAFIX SPIRAL PDS PLS CT1 0 30CM  [employer]  DIV OF J AND J [id number] Right 1 Implanted   SHLL ACET R3 3H STD 52MM - [id number] Implant SHLL ACET R3 3H STD 52MM  [employer] [id number] Right 1 Implanted   LINER ACET R3 XLPE 0D 24C39QD - [id number] Implant LINER ACET R3 XLPE 0D [id number]  [employer] [id number] Right 1 Implanted   SCRW SPH HD REFLECTION 6.5X25MM - [id number] Implant SCRW SPH HD REFLECTION 6.5X25MM  [employer] [id number] Right 1 Implanted   SCRW SPH HD REFLECTION 6.5X25MM - [id number] Implant SCRW SPH HD REFLECTION 6.5X25MM  [employer] [id number] Right 1  "Implanted   STEM FEM/HIP POLARSTEM CMTLESS STD CCD 135DEG SZ2 - [id number] Implant STEM FEM/HIP POLARSTEM CMTLESS STD CCD 135DEG SZ2  [employer] [id number] Right 1 Implanted   HD FEM/HIP OXINIUM TPR 12/14 36MM PLS0 - [id number] Implant HD FEM/HIP OXINIUM TPR 12/14 36MM PLS0  [employer] [id number] Right 1 Implanted       Estimated Blood Loss: 200cc  Specimens : none  Complications: none    DESCRIPTION OF PROCEDURE: The patient was taken to the operating room and placed in the supine position. A sequential compression device was carefully placed on the non-operative leg. Preoperative antibiotics were administered. Surgical time out was performed. After adequate induction of anesthesia, the feet were padded and placed in the Union table boots. The patient ws then transferred onto the Union table and positioned appropriately. C-arm image intensification was then used to take images of the pelvis and operative hip to be used for later comparison. The hip was then prepped and draped in the usual sterile fashion.   An incision was then made starting 2 cm lateral to the ASIS heading distal and lateral at approximately 30 degrees. The subcutaneous fat was then divided down to the fascia overlying the tensor fascia jyothi (TFL) muscle. This was sharply divided staying a few cm lateral to the interval between the sartorius and the TFL muscle. This interval was then bluntly dissected. The circumflex vessels were then identified, cauterized, and divided. There was excellent hemostasis. Cobra retractors were then placed around the femoral neck capsule. The capsule was then divided using a \"T\" capsulotomy. The retractors were then placed intracapsular and the neck osteotomy was performed. A napkin ring neck fragment was then removed and then the head was removed using a corkscrew. There were end-stage arthritic findings.   The acetabulum was then exposed with \"number 7\" retractors. The labrum and pulvinar were excised. The " starting reamer was then used to medialize the cup and then the acetabular reaming proceeded in 2 mm increments. The cup was reamed line to line. The cup was then partially seated and c-arm was used to confirm the final cup position before final seating occurred. There was excellent position and stability of the cup.  The hip was then injected with anesthetic cocktail and the cup was anchored with 2 screws in the superior and posterior quadrants. The final liner was placed.   Attention was turned to the femur. Traction was removed from the hip and the leg was brought to the neutral position. The femoral elevator hook was placed. The leg was then moved to the external rotation, extension, and adduction position. Retractors were placed around the proximal femur and then the posterior capsule and conjoined tendon was then released. The box osteotome was then used to create the starting hole. The femur was then prepared using the rat tail broach, followed by the chili-pepper broach and then we progressively broached up the final broach which fit nicely with excellent rotational and axial stability. The hip was then reduced and c-arm images were taken which confirmed appropriate fit and position on the implants. There were no complicating factors noted, and fluoro images were taken which confirmed proper restoration of leg length and offset. The trial components were removed, the hip was copiously irrigated and the final implants were then seated. C-arm images again confirmed appropriate anatomy restoration without complicating factors noted. The final head was then placed on a clean dry taper and the hip reduced.   The hip was then copiously irrigated.  There was excellent hemostasis. We placed a one-eighth inch Hemovac drain. We closed the hip in multiple layers in standard fashion. Sterile dressings were applied. At the end of the case, the sponge and needle counts were reported as being correct. There were no known  complications. The patient was then transported to the recovery room.      Gelacio Baldwin M.D.  2/10/2025

## 2025-02-10 NOTE — ANESTHESIA PREPROCEDURE EVALUATION
Anesthesia Evaluation     Patient summary reviewed   no history of anesthetic complications:   NPO Solid Status: > 8 hours  NPO Liquid Status: > 2 hours           Airway   Mallampati: II  TM distance: >3 FB  Neck ROM: full  No difficulty expected  Dental      Pulmonary     breath sounds clear to auscultation  (-) shortness of breath, recent URI, not a smoker  Cardiovascular   Exercise tolerance: good (4-7 METS)    ECG reviewed  Rhythm: regular  Rate: normal    (+) hypertension, hyperlipidemia  (-) past MI, dysrhythmias, angina      Neuro/Psych  (+) psychiatric history Anxiety and Depression  (-) seizures, CVA  GI/Hepatic/Renal/Endo    (+) GERD, thyroid problem hypothyroidism  (-)  obesity, no renal disease    Musculoskeletal     (-) neck stiffness  Abdominal    Substance History      OB/GYN          Other      history of cancer (L breast (2019)) remission                  Anesthesia Plan    ASA 2     general     (I have reviewed the patient's history and chart with the patient, including all pertinent laboratory results and imaging. I have explained the risks of anesthesia including but not limited to dental damage, corneal abrasion, nerve injury, MI, stroke, aspiration, and death. Patient has agreed to proceed.  )  intravenous induction     Anesthetic plan, risks, benefits, and alternatives have been provided, discussed and informed consent has been obtained with: patient.    Use of blood products discussed with patient .        CODE STATUS:

## 2025-02-11 LAB
GLUCOSE BLDC GLUCOMTR-MCNC: 201 MG/DL (ref 70–130)
HCT VFR BLD AUTO: 35.1 % (ref 34–46.6)
HGB BLD-MCNC: 11.3 G/DL (ref 12–15.9)

## 2025-02-11 PROCEDURE — 97161 PT EVAL LOW COMPLEX 20 MIN: CPT

## 2025-02-11 PROCEDURE — 82948 REAGENT STRIP/BLOOD GLUCOSE: CPT

## 2025-02-11 PROCEDURE — 85018 HEMOGLOBIN: CPT | Performed by: NURSE PRACTITIONER

## 2025-02-11 PROCEDURE — 25010000002 CLINDAMYCIN 900 MG/50ML SOLUTION: Performed by: NURSE PRACTITIONER

## 2025-02-11 PROCEDURE — 99024 POSTOP FOLLOW-UP VISIT: CPT | Performed by: NURSE PRACTITIONER

## 2025-02-11 PROCEDURE — 97530 THERAPEUTIC ACTIVITIES: CPT

## 2025-02-11 PROCEDURE — 85014 HEMATOCRIT: CPT | Performed by: NURSE PRACTITIONER

## 2025-02-11 PROCEDURE — G0378 HOSPITAL OBSERVATION PER HR: HCPCS

## 2025-02-11 RX ADMIN — CETIRIZINE HYDROCHLORIDE 5 MG: 10 TABLET, FILM COATED ORAL at 08:58

## 2025-02-11 RX ADMIN — LEVOTHYROXINE SODIUM 50 MCG: 50 TABLET ORAL at 05:38

## 2025-02-11 RX ADMIN — HYDROCODONE BITARTRATE AND ACETAMINOPHEN 1 TABLET: 7.5; 325 TABLET ORAL at 05:37

## 2025-02-11 RX ADMIN — ASPIRIN 81 MG: 81 TABLET, COATED ORAL at 20:11

## 2025-02-11 RX ADMIN — MELOXICAM 15 MG: 15 TABLET ORAL at 08:57

## 2025-02-11 RX ADMIN — CHLORTHALIDONE 25 MG: 25 TABLET ORAL at 08:57

## 2025-02-11 RX ADMIN — CLINDAMYCIN PHOSPHATE 900 MG: 900 INJECTION, SOLUTION INTRAVENOUS at 06:35

## 2025-02-11 RX ADMIN — ATORVASTATIN CALCIUM 80 MG: 80 TABLET, FILM COATED ORAL at 08:57

## 2025-02-11 RX ADMIN — ANASTROZOLE 1 MG: 1 TABLET, FILM COATED ORAL at 08:56

## 2025-02-11 RX ADMIN — ASPIRIN 81 MG: 81 TABLET, COATED ORAL at 08:57

## 2025-02-11 NOTE — CASE MANAGEMENT/SOCIAL WORK
Discharge Planning Assessment  [facility]     Patient Name: Olga Peres  MRN: [id number]  Today's Date: 2/11/2025    Admit Date: 2/10/2025    Plan: home with family and [facility]   Discharge Needs Assessment       Row Name 02/11/25 1142       Living Environment    People in Home child(davon), adult    Name(s) of People in Home Karely Schwarz [phone]    Current Living Arrangements home    Potentially Unsafe Housing Conditions none    Primary Care Provided by self    Provides Primary Care For no one    Family Caregiver if Needed child(davon), adult    Quality of Family Relationships helpful;involved;supportive       Resource/Environmental Concerns    Resource/Environmental Concerns none       Transition Planning    Patient/Family Anticipates Transition to home with family;home with help/services    Patient/Family Anticipated Services at Transition     Transportation Anticipated family or friend will provide       Discharge Needs Assessment    Readmission Within the Last 30 Days no previous admission in last 30 days    Equipment Currently Used at Home none    Concerns to be Addressed denies needs/concerns at this time                   Discharge Plan       Row Name 02/11/25 1145       Plan    Plan home with family and [facility]    Patient/Family in Agreement with Plan yes    Plan Comments Daughter at bedside translating for patient. Patient lives in a house with her son, Katty Schwarz [phone], and there are multiple other family members who will be staying there to assist the patient. There are 10 CROW the home. At baseline, patient is IADLS. Discussed HH and daughter agreeable to using any agency that can accept. Referral placed to [facility] and they have accepted. Family will transport home. CCP will follow.                  Continued Care and Services - Admitted Since 2/10/2025       Home Medical Care Coordination complete.      Service Provider Request Status Services Address Phone Fax  Patient Preferred    [facility] [address], [address], [place] [id number] [phone] --                  Expected Discharge Date and Time       Expected Discharge Date Expected Discharge Time    Feb 11, 2025            Demographic Summary       Row Name 02/11/25 1148       General Information    Preferred Language Azerbaijani      Row Name 02/11/25 1141       General Information    Admission Type observation    Arrived From PACU/recovery room    Referral Source admission list    Reason for Consult discharge planning    Preferred Language English                   Functional Status       Row Name 02/11/25 1142       Functional Status    Usual Activity Tolerance moderate    Current Activity Tolerance fair       Functional Status, IADL    Medications independent    Meal Preparation independent    Housekeeping independent    Laundry independent    Shopping independent       Mental Status    General Appearance WDL WDL                   Psychosocial    No documentation.                  Abuse/Neglect    No documentation.                  Legal    No documentation.                  Substance Abuse    No documentation.                  Patient Forms    No documentation.                     Ibeth Valentine RN

## 2025-02-11 NOTE — DISCHARGE PLACEMENT REQUEST
"Olga Rodriguez (74 y.o. Female)       Date of Birth   1950    Social Security Number       Address   [address] [place] [address]    Home Phone   [phone]    MRN   [id number]       Confucianism   Unknown    Marital Status   Single                            Admission Date   2/10/25    Admission Type   Elective    Admitting Provider   Gelacio Baldwin MD    Attending Provider   Gelacio Baldwin MD    Department, Room/Bed   [address], 77/1       Discharge Date       Discharge Disposition   Home-Health Care [facility]    Discharge Destination                                 Attending Provider: Gelacio Baldwin MD    Allergies: Penicillins, Sulfa Antibiotics    Isolation: None   Infection: None   Code Status: Not on file    Ht: 154.9 cm (61\")   Wt: 68.9 kg (152 lb)    Admission Cmt: None   Principal Problem: Osteoarthritis of right hip [M16.11]                   Active Insurance as of 2/10/2025       Primary Coverage       Payor Plan Insurance Group Employer/Plan Group    MEDICARE MEDICARE B ONLY        Payor Plan Address Payor Plan Phone Number Payor Plan Fax Number Effective Dates    [address] [address] [phone]  6/1/2017 - None Entered    [facility] [id number]         Subscriber Name Subscriber Birth Date Member ID       OLGA RODRIGUEZ 1950 [id number]               Secondary Coverage       Payor Plan Insurance Group Employer/Plan Group    [place] [id number]       Payor Plan Address Payor Plan Phone Number Payor Plan Fax Number Effective Dates    [employer] [address] [address] [phone]  7/1/2021 - None Entered    [facility] [id number]         Subscriber Name Subscriber Birth Date Member ID       OLGA RODRIGUEZ 1950 [id number]                     Emergency Contacts        (Rel.) Home Phone Work Phone Mobile Phone    Katty Schwarz (Son) -- -- [phone]    GRIFFINDINORAH GARAY (Daughter) [phone] -- " [phone]

## 2025-02-11 NOTE — PLAN OF CARE
Problem: Adult Inpatient Plan of Care  Goal: Plan of Care Review  Outcome: Progressing   Goal Outcome Evaluation: Received pt from PACU this shift. POD 1 R DANNA, BLANCA dsg CDI, flashing green. AAO, Latvian speaking, daughter at bedside. Norco effective in managing pain. IV ABX infused as ordered. Tentative plan to discharge home today. Resting comfortably in bed, call light within reach, all needs currently met. POC ongoing.

## 2025-02-11 NOTE — PLAN OF CARE
"Goal Outcome Evaluation:  Plan of Care Reviewed With: patient, child              Patient is a 74 y.o. female POD1 R DANNA anterior with expected post op weakness and impaired functional mobility. Patient is ind at baseline and lives with her son. Daughter at bedside this date and translates for pt. She has 10 CROW home at any entry point. Today, patient performed bed mobility with CGA, required Petr for transfers, and ambulated 90ft using rwx requiring CGA-Petr. At 90ft pt lifted RLE off ground behind her and started grabbing PT with BUE. Pt states her \"leg was failing\" but no actual knee buckling noted as she was actually ambulating better than at start of ambulation.  Pt with near fall but chair was pulled up behind her and placed in chair. Not safe to attempt stairs. Pt family states pt has not been up at all since surgery and nsg was using bedpan all night with night shift. Educated nsg that pt needs to be up in chair and ambulating with staff to restroom. Patient will benefit from skilled PT services acutely to address functional deficits as well as improve level of independence prior to discharge. Anticipate home with HHPT and 24/7 care from family upon DC but not safe to dc home today.     Anticipated Discharge Disposition (PT): home with home health, home with 24/7 care                        "

## 2025-02-11 NOTE — THERAPY EVALUATION
Patient Name: Olag Peres  : 1950    MRN: [id number]                              Today's Date: 2025       Admit Date: 2/10/2025    Visit Dx:     ICD-10-CM ICD-9-CM   1. Status post total hip replacement, right  Z96.641 V43.64   2. Primary osteoarthritis of right hip  M16.11 715.15     Patient Active Problem List   Diagnosis    Aromatase inhibitor use    Benign essential hypertension    Breast neoplasm, Tis (DCIS), left    Hyperlipidemia, mixed    Osteoporosis    Post-infectious hypothyroidism    Encounter for health maintenance examination in adult    Osteoarthritis of right hip    Chronic pain of right knee    Type 2 diabetes mellitus without complication, without long-term current use of insulin    OA (osteoarthritis) of hip     Past Medical History:   Diagnosis Date    Anxiety     Arthritis     Breast cancer 2019    dcis left    Chest congestion     Cough     Depression     GERD (gastroesophageal reflux disease)     Hip pain     Hip pain     HTN (hypertension)     Hyperlipidemia     Hyperthyroidism     Osteopenia     Seasonal allergies     Slow to wake up after anesthesia      Past Surgical History:   Procedure Laterality Date    ABDOMINOPLASTY      BREAST BIOPSY      BREAST LUMPECTOMY Left       General Information       Row Name 25 0937          Physical Therapy Time and Intention    Document Type evaluation  -CB     Mode of Treatment individual therapy;physical therapy  -CB       Row Name 25 0937          General Information    Patient Profile Reviewed yes  -CB     Prior Level of Function independent:;gait;transfer;bed mobility  -CB     Existing Precautions/Restrictions fall  -CB     Barriers to Rehab language barrier  -CB       Row Name 25 0937          Living Environment    People in Home child(davon), adult  -CB       Row Name 25 0937          Home Main Entrance    Number of Stairs, Main Entrance ten  -CB     Stair Railings, Main Entrance railings  "safe and in good condition  -CB       Row Name 02/11/25 0937          Cognition    Orientation Status (Cognition) oriented x 3  -CB       Row Name 02/11/25 0937          Safety Issues/Impairments Affecting Functional Mobility    Safety Issues Affecting Function (Mobility) insight into deficits/self-awareness;problem-solving  -CB     Impairments Affecting Function (Mobility) balance;endurance/activity tolerance;strength;range of motion (ROM);pain  -CB               User Key  (r) = Recorded By, (t) = Taken By, (c) = Cosigned By      Initials Name Provider Type    CB Melissa Barba, PT Physical Therapist                   Mobility       Row Name 02/11/25 1038          Bed Mobility    Bed Mobility supine-sit  -CB     Supine-Sit [place] (Bed Mobility) contact guard;verbal cues  -CB     Assistive Device (Bed Mobility) head of bed elevated  -CB       Row Name 02/11/25 1038          Sit-Stand Transfer    Sit-Stand [place] (Transfers) minimum assist (75% patient effort);verbal cues  -CB     Assistive Device (Sit-Stand Transfers) walker, front-wheeled  -CB       Row Name 02/11/25 1038          Gait/Stairs (Locomotion)    [place] Level (Gait) contact guard;minimum assist (75% patient effort);verbal cues  -CB     Assistive Device (Gait) walker, front-wheeled  -CB     Distance in Feet (Gait) 90  -CB     Deviations/Abnormal Patterns (Gait) gait speed decreased;stride length decreased;antalgic  -CB     Bilateral Gait Deviations forward flexed posture;heel strike decreased  -CB     Comment, (Gait/Stairs) improved steadiness as distance increased then out of nowhere the pt lifted RLE off ground behind her and starting grabbing PT with BUE. States she was falling. knee did not actually buckling but pt states \"my leg is failing\" pt denies further mobility and not safe to attempt stairs. pt had not been up at all since surgery and nsg had been using bedpan with pt all night  -CB               User Key  (r) = Recorded By, " (t) = Taken By, (c) = Cosigned By      Initials Name Provider Type    CB Melissa Barba, PT Physical Therapist                   Obj/Interventions       Row Name 02/11/25 1041          Range of Motion Comprehensive    Comment, General Range of Motion R hip ROM to 90 degrees  -CB       [facility] Name 02/11/25 1041          Strength Comprehensive (MMT)    Comment, General Manual Muscle Testing (MMT) Assessment post op weakness  -CB       [facility] Name 02/11/25 1041          Motor Skills    Therapeutic Exercise --  DANNA protocol x10 reps  -CB       [facility] Name 02/11/25 1041          Balance    Balance Assessment standing static balance;standing dynamic balance;sitting static balance  -CB     Static Sitting Balance standby assist  -CB     Static Standing Balance contact guard;minimal assist;verbal cues  -CB     Dynamic Standing Balance contact guard;minimal assist;verbal cues  -CB     Position/Device Used, Standing Balance supported;walker, front-wheeled  -CB     Balance Interventions sitting;standing;sit to stand;supported;static;dynamic;minimal challenge  -CB       [facility] Name 02/11/25 1041          Sensory Assessment (Somatosensory)    Sensory Assessment (Somatosensory) sensation intact  -CB               User Key  (r) = Recorded By, (t) = Taken By, (c) = Cosigned By      Initials Name Provider Type    CB Melissa Barba, PT Physical Therapist                   Goals/Plan       Row Name 02/11/25 1043          Bed Mobility Goal 1 (PT)    Activity/Assistive Device (Bed Mobility Goal 1, PT) bed mobility activities, all  -CB     [place] Level/Cues Needed (Bed Mobility Goal 1, PT) modified independence  -CB     Time Frame (Bed Mobility Goal 1, PT) long term goal (LTG);1 week  -CB       [facility] Name 02/11/25 1043          Transfer Goal 1 (PT)    Activity/Assistive Device (Transfer Goal 1, PT) sit-to-stand/stand-to-sit;bed-to-chair/chair-to-bed  -CB     [place] Level/Cues Needed (Transfer Goal 1, PT) standby assist  -CB     Time Frame  (Transfer Goal 1, PT) long term goal (LTG);1 week  -CB       Row Name 02/11/25 1043          Gait Training Goal 1 (PT)    Activity/Assistive Device (Gait Training Goal 1, PT) gait (walking locomotion);assistive device use;walker, rolling  -CB     [place] Level (Gait Training Goal 1, PT) standby assist  -CB     Distance (Gait Training Goal 1, PT) 150  -CB     Time Frame (Gait Training Goal 1, PT) long term goal (LTG);1 week  -CB       Row Name 02/11/25 1043          Stairs Goal 1 (PT)    Activity/Assistive Device (Stairs Goal 1, PT) ascending stairs;descending stairs  -CB     [place] Level/Cues Needed (Stairs Goal 1, PT) contact guard required  -CB     Number of Stairs (Stairs Goal 1, PT) 10  -CB     Time Frame (Stairs Goal 1, PT) long term goal (LTG);1 week  -CB       Row Name 02/11/25 1043          Therapy Assessment/Plan (PT)    Planned Therapy Interventions (PT) balance training;bed mobility training;gait training;home exercise program;patient/family education;strengthening;transfer training;ROM (range of motion);stair training  -CB               User Key  (r) = Recorded By, (t) = Taken By, (c) = Cosigned By      Initials Name Provider Type    Melissa Cavazos, PT Physical Therapist                   Clinical Impression       Row Name 02/11/25 1042          Pain    Pretreatment Pain Rating 7/10  -CB     Posttreatment Pain Rating 7/10  -CB     Pain Location hip  -CB     Pain Side/Orientation right  -CB     Response to Pain Interventions activity participation with tolerable pain  -CB       Row Name 02/11/25 1042          Plan of Care Review    Plan of Care Reviewed With patient;child  -CB     Outcome Evaluation Patient is a 74 y.o. female POD1 R DANNA anterior with expected post op weakness and impaired functional mobility. Patient is ind at baseline and lives with her son. Daughter at bedside this date and translates for pt. She has 10 CROW home at any entry point. Today, patient performed bed mobility  "with CGA, required Petr for transfers, and ambulated 90ft using rwx requiring CGA-Petr. At 90ft pt lifted RLE off ground behind her and started grabbing PT with BUE. Pt states her \"leg was failing\" but no actual knee buckling noted as she was actually ambulating better than at start of ambulation.  Pt with near fall but chair was pulled up behind her and placed in chair. Not safe to attempt stairs. Pt family states pt has not been up at all since surgery and nsg was using bedpan all night with night shift. Educated nsg that pt needs to be up in chair and ambulating with staff to restroom. Patient will benefit from skilled PT services acutely to address functional deficits as well as improve level of independence prior to discharge. Anticipate home with HHPT and 24/7 care from family upon DC but not safe to dc home today.  -CB       Row Name 02/11/25 1042          Therapy Assessment/Plan (PT)    Rehab Potential (PT) good  -CB     Criteria for Skilled Interventions Met (PT) yes  -CB     Therapy Frequency (PT) daily  -CB       Row Name 02/11/25 1042          Positioning and Restraints    Pre-Treatment Position in bed  -CB     Post Treatment Position chair  -CB     In Chair notified nsg;reclined;call light within reach;encouraged to call for assist;exit alarm on  -CB               User Key  (r) = Recorded By, (t) = Taken By, (c) = Cosigned By      Initials Name Provider Type    CB Melissa Barba, PT Physical Therapist                   Outcome Measures       Row Name 02/11/25 1044          How much help from another person do you currently need...    Turning from your back to your side while in flat bed without using bedrails? 3  -CB     Moving from lying on back to sitting on the side of a flat bed without bedrails? 3  -CB     Moving to and from a bed to a chair (including a wheelchair)? 3  -CB     Standing up from a chair using your arms (e.g., wheelchair, bedside chair)? 3  -CB     Climbing 3-5 steps with a railing? " 2  -CB     To walk in hospital room? 3  -CB     AM-PAC 6 Clicks Score (PT) 17  -CB     Highest Level of Mobility Goal 5 --> Static standing  -CB       Row Name 02/11/25 1044          Functional Assessment    Outcome Measure Options AM-PAC 6 Clicks Basic Mobility (PT)  -CB               User Key  (r) = Recorded By, (t) = Taken By, (c) = Cosigned By      Initials Name Provider Type    CB Melissa Barba, PT Physical Therapist                                 Physical Therapy Education       Title: PT OT SLP Therapies (Done)       Topic: Physical Therapy (Done)       Point: Mobility training (Done)       Learning Progress Summary            Patient Acceptance, E,TB,I,D, VU,NR by CB at 2/11/2025 1045                      Point: Home exercise program (Done)       Learning Progress Summary            Patient Acceptance, E,TB,I,D, VU,NR by CB at 2/11/2025 1045                      Point: Body mechanics (Done)       Learning Progress Summary            Patient Acceptance, E,TB,I,D, VU,NR by CB at 2/11/2025 1045                      Point: Precautions (Done)       Learning Progress Summary            Patient Acceptance, E,TB,I,D, VU,NR by CB at 2/11/2025 1045                                      User Key       Initials Effective Dates Name Provider Type Discipline     10/22/21 -  Melissa Barba, JOSE Physical Therapist PT                  PT Recommendation and Plan  Planned Therapy Interventions (PT): balance training, bed mobility training, gait training, home exercise program, patient/family education, strengthening, transfer training, ROM (range of motion), stair training  Outcome Evaluation: Patient is a 74 y.o. female POD1 R DANNA anterior with expected post op weakness and impaired functional mobility. Patient is ind at baseline and lives with her son. Daughter at bedside this date and translates for pt. She has 10 CROW home at any entry point. Today, patient performed bed mobility with CGA, required Petr for transfers, and  "ambulated 90ft using rwx requiring CGA-Petr. At 90ft pt lifted RLE off ground behind her and started grabbing PT with BUE. Pt states her \"leg was failing\" but no actual knee buckling noted as she was actually ambulating better than at start of ambulation.  Pt with near fall but chair was pulled up behind her and placed in chair. Not safe to attempt stairs. Pt family states pt has not been up at all since surgery and nsg was using bedpan all night with night shift. Educated nsg that pt needs to be up in chair and ambulating with staff to restroom. Patient will benefit from skilled PT services acutely to address functional deficits as well as improve level of independence prior to discharge. Anticipate home with HHPT and 24/7 care from family upon DC but not safe to dc home today.     Time Calculation:         PT Charges       Row Name 02/11/25 1052             Time Calculation    Start Time 0817  -CB      Stop Time 0839  -CB      Time Calculation (min) 22 min  -CB      PT Received On 02/11/25  -CB      PT - Next Appointment 02/12/25  -CB      PT Goal Re-Cert Due Date 02/18/25  -CB         Time Calculation- PT    Total Timed Code Minutes- PT 13 minute(s)  -CB         Timed Charges    22669 - PT Therapeutic Exercise Minutes 5  -CB      99654 - PT Therapeutic Activity Minutes 8  -CB         Total Minutes    Timed Charges Total Minutes 13  -CB       Total Minutes 13  -CB                User Key  (r) = Recorded By, (t) = Taken By, (c) = Cosigned By      Initials Name Provider Type    CB Melissa Barba, PT Physical Therapist                  Therapy Charges for Today       Code Description Service Date Service Provider Modifiers Qty    73607232229 HC PT THERAPEUTIC ACT EA 15 MIN 2/11/2025 Melissa Barba, PT GP 1    93865774314 HC PT EVAL LOW COMPLEXITY 3 2/11/2025 Melissa Barba, PT GP 1    49063278359 HC PT THER SUPP EA 15 MIN 2/11/2025 Melissa Barba, PT GP 1            PT G-Codes  Outcome Measure Options: AM-PAC 6 " Clicks Basic Mobility (PT)  AM-PAC 6 Clicks Score (PT): 17  PT Discharge Summary  Anticipated Discharge Disposition (PT): home with home health, home with 24/7 care    Melissa Barba, PT  2/11/2025

## 2025-02-11 NOTE — PLAN OF CARE
Goal Outcome Evaluation:VSS throughout shift. Pt ambulates from chair to bathroom and up in room with assist x1, gait belt and rolling walker. Pt has no c/o pain or nausea. Appetite is good. Call light in reach, daughter at bedside. Plan to discharge tomorrow if cleared by PT.

## 2025-02-11 NOTE — DISCHARGE SUMMARY
Patient Name: Olga Peres  Patient YOB: 1950    Date of Admission:  2/10/2025  Date of Discharge:  2/12/2025  Discharge Diagnosis: UT ARTHRP ACETBLR/PROX FEM PROSTC AGRFT/ALGRFT [26187] (TOTAL HIP ARTHROPLASTY ANTERIOR WITH HANA TABLE)  Presenting Problem/History of Present Illness: Primary osteoarthritis of right hip [M16.11]  OA (osteoarthritis) of hip [M16.9]  Admitting Physician: Dr Gelacio Baldwin  Consults:   Consults       No orders found for last 30 day(s).            DETAILS OF HOSPITAL STAY:  Patient is a 74 y.o. female was admitted to the floor following the above procedure and underwent an uncomplicated hospital stay.  Patient did well with physical therapy and was ambulating well without problems.  On the day of discharge the wound was clean, dry and intact and calf was soft and nontender and Homans sign was negative.  Patient was tolerating  without problems.  Patient will be discharged home.    Condition on Discharge:  Stable    Vital Signs  Temp:  [97.8 °F (36.6 °C)-99.1 °F (37.3 °C)] 99.1 °F (37.3 °C)  Heart Rate:  [65-86] 76  Resp:  [12-19] 16  BP: (113-182)/() 128/58    LABS:   Admission on 02/10/2025   Component Date Value Ref Range Status    Hemoglobin 02/11/2025 11.3 (L)  12.0 - 15.9 g/dL Final    Hematocrit 02/11/2025 35.1  34.0 - 46.6 % Final    Glucose 02/11/2025 201 (H)  70 - 130 mg/dL Final       No results found.        Discharge Medications     Discharge Medications        New Medications        Instructions Start Date   aspirin 81 MG EC tablet   Take 1 tablet by mouth twice daily for 14 days; then take 1 daily for 28 days      HYDROcodone-acetaminophen 7.5-325 MG per tablet  Commonly known as: NORCO   1 tablet, Oral, Every 4 Hours PRN      ondansetron 4 MG tablet  Commonly known as: Zofran   Take 1 tablet by mouth Every 8 (Eight) Hours As Needed for Nausea or Vomiting.      pantoprazole 40 MG EC tablet  Commonly known as: PROTONIX   40 mg, Oral, Daily       polyethylene glycol 17 GM/SCOOP powder  Commonly known as: MIRALAX   Mix one capful (17 g) in liquid and take by mouth 2 (Two) Times a Day for 7 days.             Changes to Medications        Instructions Start Date   meloxicam 15 MG tablet  Commonly known as: MOBIC  What changed:   when to take this  reasons to take this  additional instructions   15 mg, Oral, Daily      meloxicam 15 MG tablet  Commonly known as: MOBIC  What changed: You were already taking a medication with the same name, and this prescription was added. Make sure you understand how and when to take each.   15 mg, Oral, Daily             Continue These Medications        Instructions Start Date   albuterol sulfate  (90 Base) MCG/ACT inhaler  Commonly known as: PROVENTIL HFA;VENTOLIN HFA;PROAIR HFA   2 puffs, Inhalation, Every 4 Hours PRN      anastrozole 1 MG tablet  Commonly known as: ARIMIDEX   1 mg, Oral, Daily      atorvastatin 80 MG tablet  Commonly known as: LIPITOR   80 mg, Oral, Daily      cetirizine 5 MG tablet  Commonly known as: zyrTEC   5 mg, Daily      chlorthalidone 25 MG tablet  Commonly known as: HYGROTON   25 mg, Oral, Daily      enalapril 20 MG tablet  Commonly known as: VASOTEC   40 mg, Oral, Daily      levothyroxine 50 MCG tablet  Commonly known as: SYNTHROID, LEVOTHROID   50 mcg, Oral, Every Early Morning             Stop These Medications      PredniSONE 5 MG tablet therapy pack dosepak              Activity at Discharge:   Activity Instructions       Discharge Activity      Dr Gelacio Baldwin  [facility] [address] [place] [address]  [phone]      Discharge Information (HIP REPLACEMENT)    The first 2-3 days after surgery your pain will likely be diminished due to medications that were given to you during surgery. It is very important to follow a few simple instructions to continue with good pain control after arriving home.    Activity control :  DON'T OVERDO IT, small amounts of activity on a frequent  "basis. You are encouraged to get up and move around  for  short periods but do not engage in any prolonged walking, standing or activity until cleared by your physical therapist. You may walk short distances frequently.  You will be notified while in hospital if you have any specific hip precautions  Ice - you should ice the hip as much as possible over the first week or two.  Placing a clean hand towel or pillowcase between the icepack and skin will allow you to ice for extended periods.   Pain Medication - Take some pain medication (1-2 tablets) on a regular schedule (every 4-6 hours) for the first 72 hours after arriving home. This is important to keep the pain under control as the operative medication begins to wear off. Make sure to have food in your stomach when taking the medication. If you develop any nausea with the pain medication, try taking Zofran 30 minutes before taking the pain pills. After the first 72 hours you can take the medication as needed based on your pain.  REMEMBER - PAIN MEDICATION WORKS BETTER AT PREVENTING PAIN THAN IT DOES IN CATCHING UP TO PAIN ONCE IT INCREASES.  Physical therapy:  Follow the instructions of your physical therapist.  You may put full weight on your leg unless instructed otherwise.  If you have hip precautions, this will be discussed with you in the hospital. Continue to follow any hip precautions / restrictions until your follow-up appointment.   If you have been told that you have no hip precautions then you may sit / lay/ bend in any position within reason.  If you lay on your side then you should place a pillow between your legs for the first 2 weeks.  For all patients - \"listen to your hip\" - meaning don't force your hip into an uncomfortable position.    Showering :   The waterproof dressing will allow you to shower as soon as you get home.    The dressing should be left in place.  After 7 days the suction unit will stop functioning and at that time you may " "disconnect the suction tube.    If the dressing becomes disloged or saturated it should be changed.  Please contact our office for further assistance of this occurs. If you have a home health nurse or therapist and cannot make it into the office, have the call for further instructions before removing the dressing.  Please refer to the BLANCA information sheet if you have any questions about the dressing.  You may also call the BLANCA dressing hotline for questions related to the dressing (1-436.631.7331). If there still other problems or questions related to the dressing despite these measures then you can contact Isamar at our office [phone]    Driving : No driving during the first 2 weeks post surgery. After the 2 week visit, Dr. Baldwin, Marcie, or El will determine when you're ready to drive.    Blood Clot (DVT) Prevention:  Keep legs elevated when possible to limit swelling  Perform \"calf pump\" exercises regularly to encourage blood flow through the calf veins.  Most patients will be discharged on asprin 81mg (full strength) twice daily for 2 weeks, then once daily for four weeks. Some high risk patients may require Coumadin, Xarelto, or other blood thinners.    Follow-Up Visit: After arriving home, please call Dr Baldwin's office ([phone]) to arrange your follow-up appointment. This visit will be approximately 2 weeks post-op.     Your Implant: Your hip implant is made of the finest materials available. It is made by [employer]. For more info visit [email or web address]. There are four components:  Polarstem titanium femoral stem  R3 titanium acetabular cup  Oxidized zirconium femoral head (Oxinium ™)  Crosslinked polyethylene acetabular insert    Patient May Shower; No Tub Baths, Pools or Hot Tubs      Please instruct the patient to remove the blanca suction box before showering    Weight Bearing As Tolerated              Discharge Instructions:   1)  Patient is to continue with physical " therapy exercises daily and continue working with the physical therapist as ordered.  2)  Follow Anterior hip precautions.   3)  Patient may weight bear as tolerated.   4)  Apply ice regularly. You may ice for long periods of time as long as ice is not directly on the skin. Patient instructed on frequent calf pumping exercises.  Patient also instructed on incentive spirometer during hospitalization and encouraged to continue to use at home regularly.   5)  The dressing should be left in place. If waterproof dressing is intact the patient may shower immediately following discharge. If the dressing becomes disloged or saturated it should be changed. Please refer to the BLANCA information sheet if you have any questions about the dressing.  If you have a home health nurse or therapist they can be contacted to assist with dressing change or repair. You may also call the [facility] dressing hotline for questions related to the dressing (1-884.145.5994). If there are still other problems or questions related to the dressing despite these measures then you can contact Isamar at our office [phone].   6)  Follow up appointment in 2 weeks - patient to call the office at [phone] to schedule. 7)  Patient will be discharged on aspirin 81mg BID x 2 weeks, then daily x 4 weeks    Complete Discharge Diagnosis:    Osteoarthritis of right hip    OA (osteoarthritis) of hip          Follow-up Appointments  Future Appointments   Date Time Provider Department Center   2/25/2025  9:40 AM Gelacio Baldwin MD [facility] [address] [place]   4/7/2025  2:40 PM El Hubbard APRN [facility] [address] [place]              ARTEMIO Fuentes  02/11/25  10:46 EST    Addendum: Patient was not cleared by physical therapy due to operative leg weakness.  Patient reports as the day progressed yesterday her strength improved and she was able to ambulate around the [place].  Will have physical therapy reevaluate the patient today and if cleared can be discharged home after lunch  today.    El Hubbard, ARTEMIO  02/12/2025  07:05 EST

## 2025-02-12 ENCOUNTER — READMISSION MANAGEMENT (OUTPATIENT)
Dept: CALL CENTER | Facility: HOSPITAL | Age: 75
End: 2025-02-12
Payer: MEDICARE

## 2025-02-12 VITALS
SYSTOLIC BLOOD PRESSURE: 142 MMHG | RESPIRATION RATE: 17 BRPM | OXYGEN SATURATION: 97 % | HEART RATE: 70 BPM | TEMPERATURE: 98.6 F | HEIGHT: 61 IN | DIASTOLIC BLOOD PRESSURE: 68 MMHG | WEIGHT: 152 LBS | BODY MASS INDEX: 28.7 KG/M2

## 2025-02-12 PROCEDURE — G0378 HOSPITAL OBSERVATION PER HR: HCPCS

## 2025-02-12 PROCEDURE — 97530 THERAPEUTIC ACTIVITIES: CPT

## 2025-02-12 RX ADMIN — ANASTROZOLE 1 MG: 1 TABLET, FILM COATED ORAL at 09:05

## 2025-02-12 RX ADMIN — ASPIRIN 81 MG: 81 TABLET, COATED ORAL at 09:06

## 2025-02-12 RX ADMIN — CHLORTHALIDONE 25 MG: 25 TABLET ORAL at 09:05

## 2025-02-12 RX ADMIN — ENALAPRIL MALEATE 40 MG: 20 TABLET ORAL at 09:06

## 2025-02-12 RX ADMIN — CETIRIZINE HYDROCHLORIDE 5 MG: 10 TABLET, FILM COATED ORAL at 09:05

## 2025-02-12 RX ADMIN — MELOXICAM 15 MG: 15 TABLET ORAL at 09:06

## 2025-02-12 RX ADMIN — ATORVASTATIN CALCIUM 80 MG: 80 TABLET, FILM COATED ORAL at 09:06

## 2025-02-12 RX ADMIN — LEVOTHYROXINE SODIUM 50 MCG: 50 TABLET ORAL at 04:55

## 2025-02-12 RX ADMIN — HYDROCODONE BITARTRATE AND ACETAMINOPHEN 1 TABLET: 7.5; 325 TABLET ORAL at 04:55

## 2025-02-12 RX ADMIN — HYDROCODONE BITARTRATE AND ACETAMINOPHEN 1 TABLET: 7.5; 325 TABLET ORAL at 09:11

## 2025-02-12 NOTE — PROGRESS NOTES
Orthopedic Progress Note      Patient: Olga Peres  Date of Admission: 2/10/2025  YOB: 1950  Medical Record Number: [id number]    POD # :  2 Days Post-Op Procedure(s) (LRB):  TOTAL HIP ARTHROPLASTY ANTERIOR WITH HANA TABLE (Right)    Systemic or Specific Complaints: No Complaints    Pain Relief: some relief    Physical Exam:  74 y.o.  female  Vitals:  Temp:  [98.6 °F (37 °C)-99.5 °F (37.5 °C)] 99 °F (37.2 °C)  Heart Rate:  [69-81] 69  Resp:  [16] 16  BP: (125-171)/(54-77) 170/74  alert and oriented  Chest: Clear to auscultation  CV: Regular Rate and Rhythm  Abd: Soft, NT, with BS +  Ext: NV intact. ROM appropriate. Calf is soft and nontender. Negative Homans sign  Skin: Incision clean dry and intact w/out signs or  symptoms of infection.    Activity: Mobilizing Per P.T.   Weight Bearing: As Tolerated    Data Review     Admission on 02/10/2025   Component Date Value Ref Range Status    Hemoglobin 02/11/2025 11.3 (L)  12.0 - 15.9 g/dL Final    Hematocrit 02/11/2025 35.1  34.0 - 46.6 % Final    Glucose 02/11/2025 201 (H)  70 - 130 mg/dL Final       No results found.    Medications:  anastrozole, 1 mg, Oral, Daily  aspirin, 81 mg, Oral, Q12H  atorvastatin, 80 mg, Oral, Daily  cetirizine, 5 mg, Oral, Daily  chlorthalidone, 25 mg, Oral, Daily  enalapril, 40 mg, Oral, Daily  levothyroxine, 50 mcg, Oral, Q AM  meloxicam, 15 mg, Oral, Daily        acetaminophen    albuterol    HYDROcodone-acetaminophen    HYDROcodone-acetaminophen    HYDROcodone-acetaminophen    ondansetron    ondansetron ODT    promethazine    Assessment:  Doing well POD  # 2 Days Post-Op Procedure(s) (LRB):  TOTAL HIP ARTHROPLASTY ANTERIOR WITH HANA TABLE (Right)  Problems Addressed this Visit       * (Principal) Osteoarthritis of right hip     Other Visit Diagnoses       Status post total hip replacement, right    -  Primary    Relevant Medications    HYDROcodone-acetaminophen (NORCO) 7.5-325 MG per tablet    Other Relevant  Orders    Ambulatory Referral to Physical Therapy    Ambulatory Referral to Home Health    Walker  Walker Folding with Wheels          Diagnoses         Codes Comments    Status post total hip replacement, right    -  Primary ICD-10-CM: Z96.641  ICD-9-CM: V43.64     Primary osteoarthritis of right hip     ICD-10-CM: M16.11  ICD-9-CM: 715.15             Plan:  Continue efforts to mobilize - WBAT, reports improved leg function since PT eval  Continue Pain Control Measures - Orals  Continue incisional Care - BLANCA  DVT prophylaxis - ASA 81mg BID    Discharge Plan:Home today, ok to dc after PT session     ARTEMIO Argueta    Date: 2/12/2025  Time: 07:02 EST

## 2025-02-12 NOTE — OUTREACH NOTE
Prep Survey      Flowsheet Row Responses   [facility] patient discharged from? [facility]   Is LACE score < 7 ? Yes   Eligibility [facility]   Date of Admission 02/10/25   Date of Discharge 02/12/25   Discharge Disposition Home-Health Care Svc   Discharge diagnosis OR ARTHRP ACETBLR/PROX FEM PROSTC AGRFT/ALGRFT (88562) (TOTAL HIP ARTHROPLASTY ANTERIOR WITH HANA TABLE)   Does the patient have one of the following disease processes/diagnoses(primary or secondary)? Total Joint Replacement   Does the patient have Home health ordered? Yes   [facility]   Is there a DME ordered? Yes   What DME was ordered? [facility]   Prep survey completed? Yes            TAVIA CABRERA - Registered Nurse

## 2025-02-12 NOTE — PLAN OF CARE
Problem: Adult Inpatient Plan of Care  Goal: Plan of Care Review  Outcome: Progressing  Goal: Patient-Specific Goal (Individualized)  Outcome: Progressing  Goal: Absence of Hospital-Acquired Illness or Injury  Outcome: Progressing  Intervention: Identify and Manage Fall Risk  Recent Flowsheet Documentation  Taken 2/11/2025 2000 by Benito Schmid RN  Safety Promotion/Fall Prevention: activity supervised  Intervention: Prevent Skin Injury  Recent Flowsheet Documentation  Taken 2/11/2025 2000 by Benito Schmid RN  Body Position: position maintained  Intervention: Prevent and Manage VTE (Venous Thromboembolism) Risk  Recent Flowsheet Documentation  Taken 2/11/2025 2000 by Benito Schmid RN  VTE Prevention/Management:   bilateral   SCDs (sequential compression devices) on  Goal: Optimal Comfort and Wellbeing  Outcome: Progressing  Intervention: Monitor Pain and Promote Comfort  Recent Flowsheet Documentation  Taken 2/11/2025 2000 by Benito Schmid RN  Pain Management Interventions: care clustered  Intervention: Provide Person-Centered Care  Recent Flowsheet Documentation  Taken 2/11/2025 2000 by Benito Schmid RN  Trust Relationship/Rapport:   care explained   choices provided   questions answered   questions encouraged  Goal: Readiness for Transition of Care  Outcome: Progressing     Problem: Hip Arthroplasty  Goal: Optimal Coping  Outcome: Progressing  Goal: Absence of Bleeding  Outcome: Progressing  Goal: Effective Bowel Elimination  Outcome: Progressing  Goal: Fluid and Electrolyte Balance  Outcome: Progressing  Goal: Optimal Functional Ability  Outcome: Progressing  Intervention: Promote Optimal Functional Status  Recent Flowsheet Documentation  Taken 2/11/2025 2000 by Benito Schmid RN  Activity Management: activity encouraged   Goal Outcome Evaluation:

## 2025-02-12 NOTE — PLAN OF CARE
Goal Outcome Evaluation:   Pt being discharged home with all belongings. Family will provide transportation. Discharge instructions provided. Daughter reviewed discharge instructions and states understanding. Scripts filled by retail pharmacy and brought to pt's room. Walker provided to patient prior to discharge.

## 2025-02-12 NOTE — PLAN OF CARE
Goal Outcome Evaluation:  Plan of Care Reviewed With: patient, child        Progress: improving  Outcome Evaluation: Patient seen for PT this AM and improved mobility. She completed STS to rwx requiring CGA and then ambulated 40ft and 80ft using rwx requiring CGAx2. VC to use BUE on walker and not lift hands off walker. She completed stair training (4 steps) using R handrail requiring min-modAx2. Pt not using arms much on stairs and VC to use UE and for proper sequencing. Pt required modAx2 for descending stairs. Education to pt and daughter that I do not rec pt completing the 10 CROW home at OR. Daughter states that the pt can stay in a different room with 1 CROW and that would be better. Educated daughter someone needs to be with pt 24/7 when she is up. Daughter verablized understanding and states she feels comfortable taking care of her mother at home.    Anticipated Discharge Disposition (PT): home with home health, home with 24/7 care

## 2025-02-12 NOTE — THERAPY TREATMENT NOTE
Patient Name: Olga Peres  : 1950    MRN: [id number]                              Today's Date: 2025       Admit Date: 2/10/2025    Visit Dx:     ICD-10-CM ICD-9-CM   1. Status post total hip replacement, right  Z96.641 V43.64   2. Primary osteoarthritis of right hip  M16.11 715.15     Patient Active Problem List   Diagnosis    Aromatase inhibitor use    Benign essential hypertension    Breast neoplasm, Tis (DCIS), left    Hyperlipidemia, mixed    Osteoporosis    Post-infectious hypothyroidism    Encounter for health maintenance examination in adult    Osteoarthritis of right hip    Chronic pain of right knee    Type 2 diabetes mellitus without complication, without long-term current use of insulin    OA (osteoarthritis) of hip     Past Medical History:   Diagnosis Date    Anxiety     Arthritis     Breast cancer 2019    dcis left    Chest congestion     Cough     Depression     GERD (gastroesophageal reflux disease)     Hip pain     Hip pain     HTN (hypertension)     Hyperlipidemia     Hyperthyroidism     Osteopenia     Seasonal allergies     Slow to wake up after anesthesia      Past Surgical History:   Procedure Laterality Date    ABDOMINOPLASTY      BREAST BIOPSY      BREAST LUMPECTOMY Left     TOTAL HIP ARTHROPLASTY Right 2/10/2025    Procedure: TOTAL HIP ARTHROPLASTY ANTERIOR WITH HANA TABLE;  Surgeon: Gelacio Baldwin MD;  Location: [facility];  Service: Orthopedics;  Laterality: Right;      General Information       Row Name 25 1155          Physical Therapy Time and Intention    Document Type therapy note (daily note)  -CB     Mode of Treatment individual therapy;physical therapy  -CB       Row Name 25 1155          General Information    Existing Precautions/Restrictions fall  -CB       Row Name 25 1155          Cognition    Orientation Status (Cognition) oriented x 3  -CB       Row Name 25 1155          Safety Issues/Impairments Affecting  Functional Mobility    Safety Issues Affecting Function (Mobility) insight into deficits/self-awareness;problem-solving;safety precautions follow-through/compliance  -CB     Impairments Affecting Function (Mobility) balance;endurance/activity tolerance;strength;range of motion (ROM);pain  -CB               User Key  (r) = Recorded By, (t) = Taken By, (c) = Cosigned By      Initials Name Provider Type    CB Melissa Barba PT Physical Therapist                   Mobility       Row Name 02/12/25 1156          Bed Mobility    Comment, (Bed Mobility) [facility] pre and post session  -CB       Row Name 02/12/25 1156          Sit-Stand Transfer    Sit-Stand [place] (Transfers) verbal cues;contact guard  -CB     Assistive Device (Sit-Stand Transfers) walker, front-wheeled  -CB       Row Name 02/12/25 1156          Gait/Stairs (Locomotion)    [place] Level (Gait) contact guard;2 person assist;verbal cues  -CB     Assistive Device (Gait) walker, front-wheeled  -CB     Distance in Feet (Gait) 40  80  -CB     Deviations/Abnormal Patterns (Gait) gait speed decreased;stride length decreased;antalgic  -CB     Bilateral Gait Deviations forward flexed posture;heel strike decreased  -CB     [place] Level (Stairs) minimum assist (75% patient effort);moderate assist (50% patient effort);2 person assist;verbal cues  -CB     Handrail Location (Stairs) right side (ascending)  -CB     Number of Steps (Stairs) 4  -CB     Ascending Technique (Stairs) step-to-step  -CB     Descending Technique (Stairs) step-to-step  -CB     Comment, (Gait/Stairs) no overt LOB or unsteadiness noted during ambulation. very unsteady on stairs and not using arms as instructed therefore on descent pt did have LOB requiring modAx2 to correct.  -CB               User Key  (r) = Recorded By, (t) = Taken By, (c) = Cosigned By      Initials Name Provider Type    Melissa Cavazos PT Physical Therapist                   Obj/Interventions       Row Name  02/12/25 1159          Balance    Balance Assessment standing static balance;standing dynamic balance;sitting static balance  -CB     Static Sitting Balance modified independence  -CB     Position, Sitting Balance sitting in chair  -CB     Static Standing Balance contact guard  -CB     Dynamic Standing Balance minimal assist;moderate assist;contact guard;2-person assist  -CB     Position/Device Used, Standing Balance supported;walker, front-wheeled  -CB     Balance Interventions sitting;standing;sit to stand;static;supported;dynamic;minimal challenge  -CB               User Key  (r) = Recorded By, (t) = Taken By, (c) = Cosigned By      Initials Name Provider Type    CB Melissa Barba, PT Physical Therapist                   Goals/Plan    No documentation.                  Clinical Impression       Row Name 02/12/25 1159          Pain    Pretreatment Pain Rating 6/10  -CB     Posttreatment Pain Rating 6/10  -CB     Pain Location hip  -CB     Pain Side/Orientation right  -CB       Row Name 02/12/25 1159          Plan of Care Review    Plan of Care Reviewed With patient;child  -CB     Progress improving  -CB     Outcome Evaluation Patient seen for PT this AM and improved mobility. She completed STS to rwx requiring CGA and then ambulated 40ft and 80ft using rwx requiring CGAx2. VC to use BUE on walker and not lift hands off walker. She completed stair training (4 steps) using R handrail requiring min-modAx2. Pt not using arms much on stairs and VC to use UE and for proper sequencing. Pt required modAx2 for descending stairs. Education to pt and daughter that I do not rec pt completing the 10 CROW home at [place]. Daughter states that the pt can stay in a different room with 1 CROW and that would be better. Educated daughter someone needs to be with pt 24/7 when she is up. Daughter verablized understanding and states she feels comfortable taking care of her mother at home.  -CB       Row Name 02/12/25 1159          Positioning  and Restraints    Pre-Treatment Position sitting in chair/recliner  -CB     Post Treatment Position chair  -CB     In Chair notified nsg;reclined;call light within reach;encouraged to call for assist;exit alarm on;with family/caregiver  -CB               User Key  (r) = Recorded By, (t) = Taken By, (c) = Cosigned By      Initials Name Provider Type    Melissa Cavazos, PT Physical Therapist                   Outcome Measures       Row Name 02/12/25 1203          How much help from another person do you currently need...    Turning from your back to your side while in flat bed without using bedrails? 3  -CB     Moving from lying on back to sitting on the side of a flat bed without bedrails? 3  -CB     Moving to and from a bed to a chair (including a wheelchair)? 3  -CB     Standing up from a chair using your arms (e.g., wheelchair, bedside chair)? 3  -CB     Climbing 3-5 steps with a railing? 2  -CB     To walk in hospital room? 3  -CB     AM-PAC 6 Clicks Score (PT) 17  -CB     Highest Level of Mobility Goal 5 --> Static standing  -CB       Row Name 02/12/25 1203          Functional Assessment    Outcome Measure Options AM-PAC 6 Clicks Basic Mobility (PT)  -CB               User Key  (r) = Recorded By, (t) = Taken By, (c) = Cosigned By      Initials Name Provider Type    Melissa Cavazos, PT Physical Therapist                                 Physical Therapy Education       Title: PT OT SLP Therapies (Done)       Topic: Physical Therapy (Done)       Point: Mobility training (Done)       Learning Progress Summary            Patient Acceptance, E,TB,I, VU,NR by  at 2/12/2025 1204    Acceptance, E,TB,I,D, VU,NR by  at 2/11/2025 1045                      Point: Home exercise program (Done)       Learning Progress Summary            Patient Acceptance, E,TB,I,D, VU,NR by  at 2/11/2025 1045                      Point: Body mechanics (Done)       Learning Progress Summary            Patient Acceptance, E,TB,I, VU,NR  by CB at 2/12/2025 1204    Acceptance, E,TB,I,D, VU,NR by CB at 2/11/2025 1045                      Point: Precautions (Done)       Learning Progress Summary            Patient Acceptance, E,TB,I, VU,NR by CB at 2/12/2025 1204    Acceptance, E,TB,I,D, VU,NR by CB at 2/11/2025 1045                                      User Key       Initials Effective Dates Name Provider Type Discipline     10/22/21 -  Melissa Barba, PT Physical Therapist PT                  PT Recommendation and Plan  Planned Therapy Interventions (PT): balance training, bed mobility training, gait training, home exercise program, patient/family education, strengthening, transfer training, ROM (range of motion), stair training  Progress: improving  Outcome Evaluation: Patient seen for PT this AM and improved mobility. She completed STS to rwx requiring CGA and then ambulated 40ft and 80ft using rwx requiring CGAx2. VC to use BUE on walker and not lift hands off walker. She completed stair training (4 steps) using R handrail requiring min-modAx2. Pt not using arms much on stairs and VC to use UE and for proper sequencing. Pt required modAx2 for descending stairs. Education to pt and daughter that I do not rec pt completing the 10 CROW home at [place]. Daughter states that the pt can stay in a different room with 1 CROW and that would be better. Educated daughter someone needs to be with pt 24/7 when she is up. Daughter verablized understanding and states she feels comfortable taking care of her mother at home.     Time Calculation:         PT Charges       Row Name 02/12/25 1204             Time Calculation    Start Time 1107  -CB      Stop Time 1130  -CB      Time Calculation (min) 23 min  -CB      PT Received On 02/12/25  -CB      PT - Next Appointment 02/13/25  -CB         Time Calculation- PT    Total Timed Code Minutes- PT 23 minute(s)  -CB         Timed Charges    83588 - PT Therapeutic Activity Minutes 23  -CB         Total Minutes    Timed  Charges Total Minutes 23  -CB       Total Minutes 23  -CB                User Key  (r) = Recorded By, (t) = Taken By, (c) = Cosigned By      Initials Name Provider Type    CB Melissa Barba, PT Physical Therapist                  Therapy Charges for Today       Code Description Service Date Service Provider Modifiers Qty    96569683852  PT THERAPEUTIC ACT EA 15 MIN 2/11/2025 Melissa Barba, PT GP 1    01063247721 HC PT EVAL LOW COMPLEXITY 3 2/11/2025 Melissa Barba, PT GP 1    95415894042 HC PT THER SUPP EA 15 MIN 2/11/2025 Melissa Barba, PT GP 1    [id number]  PT THERAPEUTIC ACT EA 15 MIN 2/12/2025 Melissa Barba, PT GP 2    33461288582 HC PT THER SUPP EA 15 MIN 2/12/2025 Melissa Barba, PT GP 2            PT G-Codes  Outcome Measure Options: AM-PAC 6 Clicks Basic Mobility (PT)  AM-PAC 6 Clicks Score (PT): 17  PT Discharge Summary  Anticipated Discharge Disposition (PT): home with home health, home with 24/7 care    Melissa Barba, PT  2/12/2025

## 2025-02-12 NOTE — CASE MANAGEMENT/SOCIAL WORK
Continued Stay Note  [facility]     Patient Name: Olga Peres  MRN: [id number]  Today's Date: 2/12/2025    Admit Date: 2/10/2025    Plan: Home with family and [facility]   Discharge Plan       Row Name 02/12/25 1410       Plan    Plan Home with family and [facility]    Patient/Family in Agreement with Plan yes    Plan Comments Home with family and [facility] (accepted). Walker delivered to the patients room via [place]. CCP foolowing.                   Discharge Codes    No documentation.                 Expected Discharge Date and Time       Expected Discharge Date Expected Discharge Time    Feb 12, 2025

## 2025-02-13 ENCOUNTER — TRANSITIONAL CARE MANAGEMENT TELEPHONE ENCOUNTER (OUTPATIENT)
Dept: CALL CENTER | Facility: HOSPITAL | Age: 75
End: 2025-02-13
Payer: MEDICARE

## 2025-02-13 NOTE — OUTREACH NOTE
Call Center TCM Note      Flowsheet Row Responses   [facility] patient discharged from? [facility]   Does the patient have one of the following disease processes/diagnoses(primary or secondary)? Total Joint Replacement   Joint surgery performed? Hip   TCM attempt successful? Yes   Call start time 1447   Call end time 1450   Discharge diagnosis AL ARTHRP ACETBLR/PROX FEM PROSTC AGRFT/ALGRFT (68173) (TOTAL HIP ARTHROPLASTY ANTERIOR WITH HANA TABLE)   Does the patient have all medications related to this admission filled (includes all antibiotics, pain medications, etc.) Yes   Is the patient taking all medications as directed (includes completed medication regime)? Yes   Is the patient able to teach back alternate methods of pain control? Ice   Comments Ortho post op appt 2/25/25 after hip replacement   Does the patient have an appointment with their PCP within 7-14 days of discharge? Other   Nursing Interventions Routed TCM call to PCP office   Has home health visited the patient within 72 hours of discharge? Call prior to 72 hours   What D[facility] was [facility]   Has all DME been delivered? Yes   If the patient has started attending therapy, what post op day did they begin to attend (either in home or as an out patient)?   will be there today at 4 pm.   Did the patient receive a copy of their discharge instructions? Yes   Nursing interventions Reviewed instructions with patient   What is the patient's perception of their functional status since discharge? Improving   Is the patient able to teach back signs and symptoms of infection? Temp >100.4 for 24h or longer, Incisional drainage, Blisters around incision, Increased swelling or redness around incision (not associated with surgical edema), Severe discomfort or pain, Changes in mobility   Is the patient able to teach back how to prevent infection? Check incision daily   Is the patient/caregiver able to teach back the hierarchy of who to  call/visit for symptoms/problems? PCP, Specialist, Home health nurse, Urgent Care, ED, 911 Yes   TCM call completed? Yes   Wrap up additional comments Son reports Pt is doing well and that they have all meds and instructions. Pt has post op appt set   Call end time 1450            Jazlyn Luna RN    2/13/2025, 14:50 EST

## 2025-02-13 NOTE — CASE MANAGEMENT/SOCIAL WORK
Case Management Discharge Note      Final Note: dc home with [facility]         Selected Continued Care - Discharged on 2/12/2025 Admission date: 2/10/2025 - Discharge disposition: Home-Health Care Svc      Destination    No services have been selected for the patient.                Durable Medical Equipment    No services have been selected for the patient.                Dialysis/Infusion    No services have been selected for the patient.                Home Medical Care Coordination complete.      Service Provider Services Address Phone Fax Patient Preferred    [facility] [address] [place] [address], [address] [id number] [phone] --              Therapy    No services have been selected for the patient.                Community Resources    No services have been selected for the patient.                Community & DME    No services have been selected for the patient.                    Transportation Services  Private: Car    Final Discharge Disposition Code: 06 - home with home health care

## 2025-02-17 ENCOUNTER — TELEPHONE (OUTPATIENT)
Dept: ORTHOPEDIC SURGERY | Facility: CLINIC | Age: 75
End: 2025-02-17

## 2025-02-17 NOTE — TELEPHONE ENCOUNTER
Provider: DR NAYELI BOURGEOIS     Caller: YURY AU    Relationship to Patient: HOME HEALTH       Phone Number: [phone]    Reason for Call: HOME HEALTH UPDATE    When was the patient last seen: RT DANNA // SX DATE 2/10/25       YURY CALLED TO LET DR BOURGEOIS KNOW PATIENT'S HOME HEALTH BEGAN 02/13/2025 AND WILL CONTINUE THROUGH 02/27/2025 WITH A GOAL TO TRANSITION PATIENT TO OUTPATIENT THERAPY AS SOON AS PATIENT IS READY TO LEAVE THE HOME. NO CALL BACK NEEDED - UPDATE ONLY

## 2025-02-25 ENCOUNTER — OFFICE VISIT (OUTPATIENT)
Dept: ORTHOPEDIC SURGERY | Facility: CLINIC | Age: 75
End: 2025-02-25
Payer: MEDICARE

## 2025-02-25 VITALS — HEIGHT: 61 IN | TEMPERATURE: 97.5 F | WEIGHT: 158 LBS | BODY MASS INDEX: 29.83 KG/M2

## 2025-02-25 DIAGNOSIS — Z96.641 S/P TOTAL RIGHT HIP ARTHROPLASTY: Primary | ICD-10-CM

## 2025-02-25 DIAGNOSIS — Z96.641 STATUS POST TOTAL HIP REPLACEMENT, RIGHT: ICD-10-CM

## 2025-02-25 RX ORDER — HYDROCODONE BITARTRATE AND ACETAMINOPHEN 7.5; 325 MG/1; MG/1
1 TABLET ORAL EVERY 4 HOURS PRN
Qty: 30 TABLET | Refills: 0 | Status: SHIPPED | OUTPATIENT
Start: 2025-02-25

## 2025-02-25 NOTE — PROGRESS NOTES
Olga Peres : 1950 MRN: [id number] DATE: 2025    DIAGNOSIS: 2 week follow up right total hip anterior    SUBJECTIVE:Patient returns today for 2 week follow up of right total hip replacement. Patient reports doing well with no unusual complaints. Appears to be progressing appropriately.    OBJECTIVE:   Exam:. The incision is healing appropriately. No sign of infection. Range of motion is progressing as expected. The calf is soft and nontender with a negative Homans sign.    DIAGNOSTIC STUDIES  Xrays: 2 views of the right hip (AP pelvis and lateral right hip) were ordered and reviewed for evaluation of recent hip replacement. They demonstrate a well positioned, well aligned hip replacement without complicating factors noted. In comparison with previous films there has been interval implant placement.    ASSESSMENT: 2 week status post right hip replacement.    PLAN: 1) Staples removed and steri strips applied   2) PT exercises   3) Discontinue TOYIN hose   4) Continue ice PRN   5) WBAT   6) aspirin 81 mg orally every day for 1 month   7) Follow up in 6 weeks with repeat Xrays of right hip (2views)    Gelacio Baldwin MD  2025

## 2025-03-03 ENCOUNTER — TREATMENT (OUTPATIENT)
Dept: PHYSICAL THERAPY | Facility: CLINIC | Age: 75
End: 2025-03-03
Payer: MEDICARE

## 2025-03-03 DIAGNOSIS — G89.29 CHRONIC PAIN OF RIGHT KNEE: ICD-10-CM

## 2025-03-03 DIAGNOSIS — R29.898 IMPAIRED STRENGTH OF HIP MUSCLES: ICD-10-CM

## 2025-03-03 DIAGNOSIS — M25.651 DECREASED RANGE OF RIGHT HIP MOVEMENT: ICD-10-CM

## 2025-03-03 DIAGNOSIS — M25.561 CHRONIC PAIN OF RIGHT KNEE: ICD-10-CM

## 2025-03-03 DIAGNOSIS — Z96.641 STATUS POST TOTAL REPLACEMENT OF RIGHT HIP: Primary | ICD-10-CM

## 2025-03-03 PROCEDURE — 97161 PT EVAL LOW COMPLEX 20 MIN: CPT

## 2025-03-03 PROCEDURE — 97110 THERAPEUTIC EXERCISES: CPT

## 2025-03-03 NOTE — PATIENT INSTRUCTIONS
Access Code: [email or web address]  URL: [email or web address]  Date: 03/03/2025  Prepared by: Chapito Mitchell    Exercises  - Seated March  - 1 x daily - 7 x weekly - 3 sets - 10 reps  - Standing Hip Abduction  - 1 x daily - 7 x weekly - 3 sets - 10 reps  - Standing Marching  - 1 x daily - 7 x weekly - 3 sets - 10 reps  - Heel Raises with Counter Support  - 1 x daily - 7 x weekly - 3 sets - 10 reps  - Long Sitting Quad Set with Towel Roll Under Heel  - 1 x daily - 7 x weekly - 3 sets - 10 reps  - Supine Heel Slide with Strap  - 1 x daily - 7 x weekly - 3 sets - 10 reps

## 2025-03-03 NOTE — PROGRESS NOTES
Physical Therapy Initial Evaluation and Plan of Care  Clinic Location [address] [place] [facility] [address], [place] [id number]    Patient: Olga Peres   : 1950  Diagnosis/ICD-10 Code:  Status post total replacement of right hip [Z96.641]  Referring practitioner: ARTEMIO Argueta  Date of Initial Visit: 3/3/2025  Today's Date: 3/3/2025  Patient seen for 1 session         Visit Diagnoses:    ICD-10-CM ICD-9-CM   1. Status post total replacement of right hip  Z96.641 V43.64   2. Impaired strength of hip muscles  R29.898 781.99   3. Decreased range of right hip movement  M25.651 719.55   4. Chronic pain of right knee  M25.561 719.46    G89.29 338.29     Pt arrived 18 minutes late for tx session, session did not begin until 30 minutes after scheduled time. Pt denies need for  service and her daughter will serve as her  today.    Subjective Questionnaire: LEFS: 33/80      Subjective Evaluation    History of Present Illness  Mechanism of injury: Pt presents to PT s/p R DANNA on 2/10/25. Pt underwent anterior hip procedure. Pt has underwent HH PT for two weeks, 3x/week, prior to today's session. Pt reports hip feels quite fine, but her knee pain she was experiencing piror to sx has worsened now. CC is R knee pain. Does have difficulty with quadriceps exercises. Pt denies any hip pain today. Pt reports swelling at R LE. Daughter voices the pt does well going up stairs but requires assistance descending stairs.    Pts daughter voices she believes her mother waited too long to undergo her DANNA because her mother had lost a lot of strength and function prior to the surgery.    Pain  At best pain ratin  At worst pain rating: 10  Location: R knee  Quality: dull ache and sharp  Relieving factors: medications  Progression: worsening    Social Support  Lives in: multiple-level home  Lives with: adult children    Treatments  Previous treatment: physical therapy  Patient Goals  Patient  goals for therapy: increased strength, independence with ADLs/IADLs, return to sport/leisure activities, improved balance, decreased pain and decreased edema             Objective          Active Range of Motion     Additional Active Range of Motion Details  Active motion not well evaluated today due to pts lack of hip strength and difficulty performing these motions. Pt displayed inability to lift foot off ground/table to perform knee flexion in the sitting and supine position.    Passive Range of Motion   Left Hip   Flexion: 85 degrees     Strength/Myotome Testing     Left Hip   Planes of Motion   Flexion: 3-  Extension: 3+  Abduction: 4-  Adduction: 3+    Right Hip   Normal muscle strength    Left Knee   Normal strength    Right Knee   Flexion: 3+  Extension: 3    Ambulation   Weight-Bearing Status   Weight-Bearing Status (Right): weight-bearing as tolerated    Assistive device used: two-wheeled walker    Ambulation: Level Surfaces   Ambulation with assistive device: independent    Ambulation: Stairs   Ascend stairs: contact guard assist  Descend stairs: contact guard assist  Pattern: non-reciprocal  Railings: one rail    Observational Gait   Decreased walking speed and stride length.           Assessment & Plan       Assessment  Impairments: abnormal gait, abnormal muscle tone, abnormal or restricted ROM, activity intolerance, impaired balance, impaired physical strength, lacks appropriate home exercise program and pain with function   Functional limitations: walking, uncomfortable because of pain, moving in bed and sitting   Assessment details: Pt is a 73 y/o Bulgarian speaking pleasant lady s/p R DANNA. Pt presents with R hip strength deficits, ROM deficits, gait deficits, R knee pain and strength deficits, and she has difficulty with her normal ADLs. Today's eval limited today due to pts protocol restrictions as well has her current strength level limiting her hip function. Will benefit from skilled PT services  in order to address listed impairments and increase tolerance to normal daily activities including ADLs and recreational activities.      Goals  Plan Goals: Short Term Goals: 2-4 weeks. Patient will:  1. Be independent with initial HEP  2. Be instructed in posture and body mechanics  3. Demonstrate TrA contraction during exercises in clinic without need for cueing.    Long Term Goals: 6-12 weeks. Patient will:  1. Demonstrate improved Right lower extremity/core MMT of >/= 4+/5 to allow for return to recreational/daily activities without increased pain.  2. Demonstrate normal lower extremity flexibility to allow for walking/ADLs/performance of household tasks without pain.  3. Have no soft tissue restriction in involved musculature.  4. Report pain of </= 1/10 with all daily activities.  6. Perceived disability </= 15% as measured on LEFS  7. Be independent with long term HEP    Plan  Therapy options: will be seen for skilled therapy services  Planned modality interventions: cryotherapy, electrical stimulation/Russian stimulation, thermotherapy (hydrocollator packs) and ultrasound  Planned therapy interventions: abdominal trunk stabilization, ADL retraining, balance/weight-bearing training, body mechanics training, flexibility, functional ROM exercises, gait training, home exercise program, joint mobilization, manual therapy, neuromuscular re-education, soft tissue mobilization, spinal/joint mobilization, strengthening, stretching and therapeutic activities  Frequency: 2x week  Duration in weeks: 12  Treatment plan discussed with: patient            Timed:         Manual Therapy:         mins  05526;     Therapeutic Exercise:    10     mins  34505;     Neuromuscular Deirdre:        mins  17733;    Therapeutic Activity:     5     mins  86065;     Gait Training:           mins  00115;     Ultrasound:          mins  08852;    Ionto                                   mins   69541  Self Care                            mins    86941  [facility]         mins 85167      Un-Timed:  Electrical Stimulation:         mins  87334 ( );  Dry Needling          mins self-pay  Traction          mins 87190  Low Eval     15     Mins  92968  Mod Eval          Mins  43715  High Eval                            Mins  73394        Timed Treatment:   15   mins   Total Treatment:     30   mins          PT: Chapito Mitchell PT     [place] License #: [id number]  Electronically signed by Chapito Mitchell PT, 03/03/25, 12:20 PM EST    Certification Period: 3/3/2025 thru 5/31/2025  I certify that the therapy services are furnished while this patient is under my care.  The services outlined above are required by this patient, and will be reviewed every 90 days.         Physician Signature:__________________________________________________    PHYSICIAN: El Hubbard APRN  NPI: [id number]                                      DATE:      Please sign and return via fax to [email or web address] . Thank you, Psychiatric Physical Therapy.

## 2025-03-06 ENCOUNTER — TREATMENT (OUTPATIENT)
Dept: PHYSICAL THERAPY | Facility: CLINIC | Age: 75
End: 2025-03-06
Payer: MEDICARE

## 2025-03-06 DIAGNOSIS — M25.651 DECREASED RANGE OF RIGHT HIP MOVEMENT: ICD-10-CM

## 2025-03-06 DIAGNOSIS — Z96.641 STATUS POST TOTAL REPLACEMENT OF RIGHT HIP: Primary | ICD-10-CM

## 2025-03-06 DIAGNOSIS — G89.29 CHRONIC PAIN OF RIGHT KNEE: ICD-10-CM

## 2025-03-06 DIAGNOSIS — M25.561 CHRONIC PAIN OF RIGHT KNEE: ICD-10-CM

## 2025-03-06 DIAGNOSIS — R29.898 IMPAIRED STRENGTH OF HIP MUSCLES: ICD-10-CM

## 2025-03-06 NOTE — PROGRESS NOTES
Physical Therapy Daily Treatment Note  [facility] [place]   [address], [address]  [place] [address]  P: [phone]  F: [phone]    Patient: Olga Peres   : 1950  Referring practitioner: ARTEMIO Argueta  Date of Initial Visit: Type: THERAPY  Noted: 3/3/2025  Today's Date: 3/6/2025  Patient seen for 2 sessions       Visit Diagnoses:    ICD-10-CM ICD-9-CM   1. Status post total replacement of right hip  Z96.641 V43.64   2. Impaired strength of hip muscles  R29.898 781.99   3. Decreased range of right hip movement  M25.651 719.55   4. Chronic pain of right knee  M25.561 719.46    G89.29 338.29     Pt denies need for  service and her daughter will serve as her  today.     Subjective     Olga Peres reports: Pt's daughter is with her this visit. Pt says her knee is feeling better compared to last visit. She has trouble with her R leg buckling as she walks down the stairs.         Objective   See Exercise, Manual, and Modality Logs for complete treatment.       Assessment:  Pt finds it easier to perform hip strengthening interventions standing in // bars vs sitting or supine. Added clamshells for hip strengthening and stability. Subjective reports of knee pain are improving with HEP and use of cryotherapy at home. Pt will continue to benefit from quad strengthening for ease with stair navigation.         Plan:  Progress per Plan of Care            Timed:         Manual Therapy:         mins  73739;     Therapeutic Exercise:    25     mins  87434;     Neuromuscular Deirdre:    9    mins  46342;    Therapeutic Activity:     10     mins  29355;     Gait Training:           mins  33984;     Ultrasound:          mins  80459;    Ionto                                   mins  56030  Self Care                            mins  71856    Un-Timed:  Electrical Stimulation:         mins  41823 ( );  Traction          mins  11875        Timed Treatment:   44  mins   Total Treatment:     44   mins      Mitchell Guzman, Physical Therapist Assistant  [place] License #: [id number]

## 2025-03-11 ENCOUNTER — TELEPHONE (OUTPATIENT)
Dept: PHYSICAL THERAPY | Facility: CLINIC | Age: 75
End: 2025-03-11

## 2025-03-11 NOTE — TELEPHONE ENCOUNTER
Caller: DAUGHTER FROM OUT OF TOWN    Relationship: Child    What was the call regarding: NOT FEELING WELL, CANCEL LYFT

## 2025-03-13 ENCOUNTER — TREATMENT (OUTPATIENT)
Dept: PHYSICAL THERAPY | Facility: CLINIC | Age: 75
End: 2025-03-13
Payer: MEDICARE

## 2025-03-13 DIAGNOSIS — Z96.641 STATUS POST TOTAL REPLACEMENT OF RIGHT HIP: Primary | ICD-10-CM

## 2025-03-13 DIAGNOSIS — M25.561 CHRONIC PAIN OF RIGHT KNEE: ICD-10-CM

## 2025-03-13 DIAGNOSIS — R29.898 IMPAIRED STRENGTH OF HIP MUSCLES: ICD-10-CM

## 2025-03-13 DIAGNOSIS — G89.29 CHRONIC PAIN OF RIGHT KNEE: ICD-10-CM

## 2025-03-13 DIAGNOSIS — M25.651 DECREASED RANGE OF RIGHT HIP MOVEMENT: ICD-10-CM

## 2025-03-13 NOTE — PROGRESS NOTES
Physical Therapy Daily Treatment Note  [facility] [place]   [address], [address]  [place] [address]  P: [phone]  F: [phone]    Patient: Olga Peres   : 1950  Referring practitioner: ARTEMIO Argueta  Date of Initial Visit: Type: THERAPY  Noted: 3/3/2025  Today's Date: 3/13/2025  Patient seen for 3 sessions       Visit Diagnoses:    ICD-10-CM ICD-9-CM   1. Status post total replacement of right hip  Z96.641 V43.64   2. Impaired strength of hip muscles  R29.898 781.99   3. Decreased range of right hip movement  M25.651 719.55   4. Chronic pain of right knee  M25.561 719.46    G89.29 338.29     Pt denies need for  service and her daughter will serve as her  today.     Olga Peres reports: she has been sick which is why she missed last session. She also reports she has not been eating much. Pt questions why she is not getting stronger and was explained to that it may take time to regain strength and it is important to eat meals throughout the day. Daughter also reiterates that she was very weak prior to her hip replacement and her daughter also voices there may be a psychological component playing a role in her lack of tolerance to her rehab.    Subjective     Objective          Swelling     Left Knee Girth Measurement (cm)   Joint line: 39.5 cm    Right Knee Girth Measurement (cm)   Joint line: 42 cm     General Comments     Ankle/Foot Comments   Swelling more noticeable at R malleoli compared to L.     See Exercise, Manual, and Modality Logs for complete treatment.     Edema present throughout R LE below knee and at ankle with associated increased tenderness.    Assessment:  Pt did not tolerate tx well today and continues to experiences pain with most hip motions. No obvious deformities or issues noted with the surgical implant. She does however have increased R LE edema present at the knee and down to the ankle,  with associated tenderness to palpation. Exercises performed today were active assist. Pts media has been updated with pictures of her edema comparison from side-to-side, found in the media tab. Pt instructed to elevate her leg, perform ankle pumps, and ice intermittently for edema control. She will benefit from continued PT services in order to progress her strength and independence.      Plan:  Progress per POC.         Timed:         Manual Therapy:         mins  87543;     Therapeutic Exercise:    25     mins  86663;     Neuromuscular Deirdre:        mins  85367;    Therapeutic Activity:          mins  88014;     Gait Training:           mins  90913;     Ultrasound:          mins  79504;    Ionto                                   mins  83994  Self Care                            mins  07161  Traction          mins 18932      Un-Timed:  Canalith Repos         mins 45986  Electrical Stimulation:         mins  82907 ( );  Dry Needling          mins self-pay  Traction          mins 92622        Timed Treatment:   25   mins   Total Treatment:     60   mins (pt spent last half of session with legs elevated on bolster to help with swelling)    Chapito Mitchell, PT  [place] License #: [id number]    Physical Therapist

## 2025-03-18 ENCOUNTER — TREATMENT (OUTPATIENT)
Dept: PHYSICAL THERAPY | Facility: CLINIC | Age: 75
End: 2025-03-18
Payer: MEDICARE

## 2025-03-18 DIAGNOSIS — R29.898 IMPAIRED STRENGTH OF HIP MUSCLES: ICD-10-CM

## 2025-03-18 DIAGNOSIS — G89.29 CHRONIC PAIN OF RIGHT KNEE: ICD-10-CM

## 2025-03-18 DIAGNOSIS — M25.561 CHRONIC PAIN OF RIGHT KNEE: ICD-10-CM

## 2025-03-18 DIAGNOSIS — Z96.641 STATUS POST TOTAL REPLACEMENT OF RIGHT HIP: Primary | ICD-10-CM

## 2025-03-18 DIAGNOSIS — M25.651 DECREASED RANGE OF RIGHT HIP MOVEMENT: ICD-10-CM

## 2025-03-18 NOTE — PROGRESS NOTES
Physical Therapy Daily Treatment Note  [facility] [place]   [address], [address]  [place] [address]  P: [phone]  F: [phone]    Patient: Olga Peres   : 1950  Referring practitioner: ARTEMIO Argueta  Date of Initial Visit: Type: THERAPY  Noted: 3/3/2025  Today's Date: 3/18/2025  Patient seen for 4 sessions       Visit Diagnoses:    ICD-10-CM ICD-9-CM   1. Status post total replacement of right hip  Z96.641 V43.64   2. Impaired strength of hip muscles  R29.898 781.99   3. Decreased range of right hip movement  M25.651 719.55   4. Chronic pain of right knee  M25.561 719.46    G89.29 338.29     Pt denies need for  service and her daughter will serve as her  today.     Olga Peres reports: her swelling has gone down some since last session and her HEP has gotten easier. Her daughter reports she has been elevating her leg more and has been actively assisting her with her HEP.    Subjective     Objective   See Exercise, Manual, and Modality Logs for complete treatment.       Assessment:  Pt tolerated today's treatment session well with no adverse responses during treatment session. Pt continues to display limitations including hip strength and ROM deficits but showed signs of progression today. Continues to require assist with some active motion exercises. Her HEP was reviewed and revised today to ensure that she keeps up with it at home and understands how to do it safely. Pt will benefit from continued skilled PT services.       Plan:  Progress per POC.         Timed:         Manual Therapy:         mins  11609;     Therapeutic Exercise:    15     mins  93244;     Neuromuscular Deirdre:    7    mins  62349;    Therapeutic Activity:     8     mins  31098;     Gait Training:           mins  17069;     Ultrasound:          mins  69133;    Ionto                                   mins  54282  Self Care                             mins  33611  Traction          mins 87460      Un-Timed:  Canalith Repos         mins 07696  Electrical Stimulation:        mins  92209 ( );  Dry Needling          mins self-pay  Traction          mins 20839        Timed Treatment:   30   mins   Total Treatment:     36   mins    Chapito Mitchell, PT  [place] License #: [id number]    Physical Therapist

## 2025-03-20 ENCOUNTER — TREATMENT (OUTPATIENT)
Dept: PHYSICAL THERAPY | Facility: CLINIC | Age: 75
End: 2025-03-20
Payer: MEDICARE

## 2025-03-20 DIAGNOSIS — R29.898 IMPAIRED STRENGTH OF HIP MUSCLES: ICD-10-CM

## 2025-03-20 DIAGNOSIS — M25.561 CHRONIC PAIN OF RIGHT KNEE: ICD-10-CM

## 2025-03-20 DIAGNOSIS — G89.29 CHRONIC PAIN OF RIGHT KNEE: ICD-10-CM

## 2025-03-20 DIAGNOSIS — Z96.641 STATUS POST TOTAL REPLACEMENT OF RIGHT HIP: Primary | ICD-10-CM

## 2025-03-20 DIAGNOSIS — M25.651 DECREASED RANGE OF RIGHT HIP MOVEMENT: ICD-10-CM

## 2025-03-20 NOTE — PROGRESS NOTES
Physical Therapy Daily Treatment Note  [facility] [place]   [address], [address]  [place] [address]  P: [phone]  F: [phone]    Patient: Olga Peres   : 1950  Referring practitioner: ARTEMIO Argueta  Date of Initial Visit: Type: THERAPY  Noted: 3/3/2025  Today's Date: 3/21/2025  Patient seen for 5 sessions       Visit Diagnoses:    ICD-10-CM ICD-9-CM   1. Status post total replacement of right hip  Z96.641 V43.64   2. Impaired strength of hip muscles  R29.898 781.99   3. Decreased range of right hip movement  M25.651 719.55   4. Chronic pain of right knee  M25.561 719.46    G89.29 338.29         Subjective     Olga Peres reports: Has good days and bad days. Her ability to perform HEP is improving. She can lift her leg more easily.         Objective   See Exercise, Manual, and Modality Logs for complete treatment.       Assessment:  Shortened session due to delay in transportation bringing her to PT. Pt continues to struggle with engaging R quad muscles, needing tactile cues for quad engagement. Active assist given with SLR and supine marches activity due to poor hip flexor strength. Pt will continue to benefit from skilled therapy to improve aforementioned deficits.         Plan:  Progress per Plan of Care            Timed:         Manual Therapy:         mins  01090;     Therapeutic Exercise:    16     mins  35402;     Neuromuscular Deirdre:        mins  96888;    Therapeutic Activity:          mins  52052;     Gait Training:           mins  75870;     Ultrasound:          mins  40244;    Ionto                                   mins  17816  Self Care                            mins  49205    Un-Timed:  Electrical Stimulation:         mins  09405 ( );  Traction          mins 23202        Timed Treatment:   16   mins   Total Treatment:     39   mins      Mitchell Guzman, Physical Therapist Assistant  [place] License #: [id number]

## 2025-03-25 ENCOUNTER — TREATMENT (OUTPATIENT)
Dept: PHYSICAL THERAPY | Facility: CLINIC | Age: 75
End: 2025-03-25
Payer: MEDICARE

## 2025-03-25 DIAGNOSIS — Z96.641 STATUS POST TOTAL REPLACEMENT OF RIGHT HIP: Primary | ICD-10-CM

## 2025-03-25 DIAGNOSIS — M25.651 DECREASED RANGE OF RIGHT HIP MOVEMENT: ICD-10-CM

## 2025-03-25 DIAGNOSIS — R29.898 IMPAIRED STRENGTH OF HIP MUSCLES: ICD-10-CM

## 2025-03-25 DIAGNOSIS — G89.29 CHRONIC PAIN OF RIGHT KNEE: ICD-10-CM

## 2025-03-25 DIAGNOSIS — M25.561 CHRONIC PAIN OF RIGHT KNEE: ICD-10-CM

## 2025-03-25 PROCEDURE — 97116 GAIT TRAINING THERAPY: CPT

## 2025-03-25 PROCEDURE — 97110 THERAPEUTIC EXERCISES: CPT

## 2025-03-25 NOTE — PROGRESS NOTES
Physical Therapy Daily Treatment Note  [facility] [place]   [address], [address]  [place] [address]  P: [phone]  F: [phone]    Patient: Olga Peres   : 1950  Referring practitioner: ARTEMIO Argueta  Date of Initial Visit: Type: THERAPY  Noted: 3/3/2025  Today's Date: 3/25/2025  Patient seen for 6 sessions       Visit Diagnoses:    ICD-10-CM ICD-9-CM   1. Status post total replacement of right hip  Z96.641 V43.64   2. Impaired strength of hip muscles  R29.898 781.99   3. Decreased range of right hip movement  M25.651 719.55   4. Chronic pain of right knee  M25.561 719.46    G89.29 338.29     Language Line  ID: [id number]    Olga Peres reports: she has been walking at home without her walker, but uses the walls and furniture to help her.    After her knee buckling episode, her daughter informed PT that she had difficulty with ambulating stairs using R LE for years prior to her R hip replacement.  Subjective     Objective   See Exercise, Manual, and Modality Logs for complete treatment.     Pt attempted to ascend a stair with her R LE. Her R knee buckled and she hit her knee on the step, but was able to catch herself with her arms and with PT assistance. Pt had no lasting pain of impact on her LE function and was able to ambulate out of the clinic independently with her RW.    Assessment:  Pt showed improvements during today's tx session evident by ability to ambulate over 4in hurdles without compensation strategies. SAFE report filed due to pts episode of her R knee buckling on the stairs today. Pt continues to display limitations including R LE strength and ROM deficits impacting her ability to perform ADLs. Pt will benefit from continued skilled PT services.       Plan:  Progress per POC.         Timed:         Manual Therapy:         mins  50310;     Therapeutic Exercise:    12     mins  66579;     Neuromuscular  Deirdre:        mins  56302;    Therapeutic Activity:     13     mins  02270;     Gait Trainin     mins  81568;     Ultrasound:          mins  91837;    Ionto                                   mins  40709  Self Care                            mins  73930  Traction          mins 36309      Un-Timed:  Canalith Repos         mins 96198  Electrical Stimulation:         mins  20498 ( );  Dry Needling          mins self-pay  Traction          mins 45016        Timed Treatment:   34   mins   Total Treatment:     45   mins    Chapito Mitchell, PT  [place] License #: [id number]    Physical Therapist

## 2025-03-27 ENCOUNTER — TREATMENT (OUTPATIENT)
Dept: PHYSICAL THERAPY | Facility: CLINIC | Age: 75
End: 2025-03-27
Payer: MEDICARE

## 2025-03-27 DIAGNOSIS — R29.898 IMPAIRED STRENGTH OF HIP MUSCLES: ICD-10-CM

## 2025-03-27 DIAGNOSIS — Z96.641 STATUS POST TOTAL REPLACEMENT OF RIGHT HIP: Primary | ICD-10-CM

## 2025-03-27 DIAGNOSIS — G89.29 CHRONIC PAIN OF RIGHT KNEE: ICD-10-CM

## 2025-03-27 DIAGNOSIS — M25.561 CHRONIC PAIN OF RIGHT KNEE: ICD-10-CM

## 2025-03-27 DIAGNOSIS — M25.651 DECREASED RANGE OF RIGHT HIP MOVEMENT: ICD-10-CM

## 2025-03-27 NOTE — PROGRESS NOTES
Physical Therapy Daily Treatment Note  [facility] [place]   [address], [address]  [place] [address]  P: [phone]  F: [phone]    Patient: Olga Peres   : 1950  Referring practitioner: ARTEMIO Argueta  Date of Initial Visit: Type: THERAPY  Noted: 3/3/2025  Today's Date: 3/27/2025  Patient seen for 7 sessions       Visit Diagnoses:    ICD-10-CM ICD-9-CM   1. Status post total replacement of right hip  Z96.641 V43.64   2. Impaired strength of hip muscles  R29.898 781.99   3. Decreased range of right hip movement  M25.651 719.55   4. Chronic pain of right knee  M25.561 719.46    G89.29 338.29         Olga Peres reports: she is doing ok today. Knee is sore and she has had some swelling, but is ambulating without difficulty. Daughter accompanies her into clinic today.    Subjective     Objective   See Exercise, Manual, and Modality Logs for complete treatment.     Requires increased rest breaks due to fatigue.  R knee edema present today. Pain reproduced with R knee flexion but she tolerated all exercises well and was able to do them properly.    Assessment:  Pt tolerated today's treatment session well with no adverse responses during treatment session. Pt continues to display limitations including R LE strength deficits and ROM deficits due to her post-op status and PLOF. She has shown evidence of progression by her increased tolerance to exercises and ability to complete more exercises in the clinic. HEP updated today with glut bridges. Pt will benefit from continued skilled PT services.       Plan:  Progress per POC.         Timed:         Manual Therapy:         mins  56371;     Therapeutic Exercise:    12     mins  85440;     Neuromuscular Deirdre:    8    mins  40592;    Therapeutic Activity:     18     mins  04423;     Gait Training:           mins  56756;     Ultrasound:          mins  79855;    Ionto                                    mins  76243  Self Care                            mins  95524  Traction          mins 97714      Un-Timed:  Canalith Repos         mins 21656  Electrical Stimulation:         mins  33272 ( );  Dry Needling          mins self-pay  Traction          mins 51890        Timed Treatment:   38   mins   Total Treatment:     55   mins    Chapito Mitchell, PT  [place] License #: [id number]    Physical Therapist

## 2025-04-01 ENCOUNTER — TREATMENT (OUTPATIENT)
Dept: PHYSICAL THERAPY | Facility: CLINIC | Age: 75
End: 2025-04-01
Payer: MEDICARE

## 2025-04-01 DIAGNOSIS — M25.561 CHRONIC PAIN OF RIGHT KNEE: ICD-10-CM

## 2025-04-01 DIAGNOSIS — R29.898 IMPAIRED STRENGTH OF HIP MUSCLES: ICD-10-CM

## 2025-04-01 DIAGNOSIS — M25.651 DECREASED RANGE OF RIGHT HIP MOVEMENT: ICD-10-CM

## 2025-04-01 DIAGNOSIS — G89.29 CHRONIC PAIN OF RIGHT KNEE: ICD-10-CM

## 2025-04-01 DIAGNOSIS — Z96.641 STATUS POST TOTAL REPLACEMENT OF RIGHT HIP: Primary | ICD-10-CM

## 2025-04-01 PROCEDURE — 97110 THERAPEUTIC EXERCISES: CPT

## 2025-04-01 PROCEDURE — 97530 THERAPEUTIC ACTIVITIES: CPT

## 2025-04-01 PROCEDURE — 97112 NEUROMUSCULAR REEDUCATION: CPT

## 2025-04-01 NOTE — PROGRESS NOTES
Physical Therapy Daily Treatment Note  [facility] [place]   [address], [address]  [place] [address]  P: [phone]  F: [phone]    Patient: Olga Peres   : 1950  Referring practitioner: ARTEMIO Argueta  Date of Initial Visit: Type: THERAPY  Noted: 3/3/2025  Today's Date: 2025  Patient seen for 8 sessions       Visit Diagnoses:    ICD-10-CM ICD-9-CM   1. Status post total replacement of right hip  Z96.641 V43.64   2. Impaired strength of hip muscles  R29.898 781.99   3. Decreased range of right hip movement  M25.651 719.55   4. Chronic pain of right knee  M25.561 719.46    G89.29 338.29         Olga Peres reports: continues to have R LE swelling and knee pain. No new complaints. Follow up with ortho in two weeks.    Subjective     Objective   See Exercise, Manual, and Modality Logs for complete treatment.     Pt able to ambulate (I) in clinic w/ RW.    Requires min A with transfers to help move R LE.    Performs SLR and quad set exercises with active assist due to R LE weakness.    Assessment:  Pt tolerated today's treatment session well with no adverse responses during treatment session. Pt continues to display limitations including R LE strength deficits impacting her tolerance to ADLs. R LE continues to be swollen and she continues to voice R knee pain from her recent contusion injury, but symptoms of swelling and weakness are consistent with her status prior to this incident. She still requires active assist for some exercises, although she does display improvements in her ability to initiate the movements. Pt will benefit from continued skilled PT services.       Plan:  Progress per POC.         Timed:         Manual Therapy:         mins  27248;     Therapeutic Exercise:    15     mins  34543;     Neuromuscular Deirdre:    13    mins  29100;    Therapeutic Activity:     10     mins  00878;     Gait Training:           mins   53925;     Ultrasound:          mins  62766;    Ionto                                   mins  42452  Self Care                            mins  02580  Traction          mins 28305      Un-Timed:  Canalith Repos         mins 40054  Electrical Stimulation:         mins  21178 ( );  Dry Needling          mins self-pay  Traction          mins 02979        Timed Treatment:   38   mins   Total Treatment:     58   mins    Chapito Mitchell PT  [place] License #: [id number]    Physical Therapist

## 2025-04-03 ENCOUNTER — TREATMENT (OUTPATIENT)
Dept: PHYSICAL THERAPY | Facility: CLINIC | Age: 75
End: 2025-04-03
Payer: MEDICARE

## 2025-04-03 DIAGNOSIS — Z96.641 STATUS POST TOTAL REPLACEMENT OF RIGHT HIP: Primary | ICD-10-CM

## 2025-04-03 DIAGNOSIS — M25.651 DECREASED RANGE OF RIGHT HIP MOVEMENT: ICD-10-CM

## 2025-04-03 DIAGNOSIS — G89.29 CHRONIC PAIN OF RIGHT KNEE: ICD-10-CM

## 2025-04-03 DIAGNOSIS — R29.898 IMPAIRED STRENGTH OF HIP MUSCLES: ICD-10-CM

## 2025-04-03 DIAGNOSIS — M25.561 CHRONIC PAIN OF RIGHT KNEE: ICD-10-CM

## 2025-04-03 NOTE — PROGRESS NOTES
Physical Therapy Daily Treatment Note  [facility] [place]   [address], [address]  [place] [address]  P: [phone]  F: [phone]    Patient: Olga Peres   : 1950  Referring practitioner: ARTEMIO Argueta  Date of Initial Visit: Type: THERAPY  Noted: 3/3/2025  Today's Date: 4/3/2025  Patient seen for 9 sessions       Visit Diagnoses:    ICD-10-CM ICD-9-CM   1. Status post total replacement of right hip  Z96.641 V43.64   2. Impaired strength of hip muscles  R29.898 781.99   3. Decreased range of right hip movement  M25.651 719.55   4. Chronic pain of right knee  M25.561 719.46    G89.29 338.29         Subjective     Olga Peres reports: My knee is so-so. I did not take pain meds because I'm trying to conserve them. I was sitting on the toilet today and was able to lift my leg up pretty high by myself.         Objective   See Exercise, Manual, and Modality Logs for complete treatment.       Assessment:  Therapist discussed use of pt's reciprocal bike at home for improvement in ROM of knee. Pt benefits from verbal cues for foot placement and pain expectations with STS activity. Pt continues to demonstrate quad weakness, requiring mod A from therapist for hip flexion supine exercises. Pt will continue to benefit from skilled therapy to improve ROM and strength of R hip and knee.         Plan:  Progress per Plan of Care            Timed:         Manual Therapy:         mins  47435;     Therapeutic Exercise:    25     mins  47398;     Neuromuscular Deirdre:    10    mins  14481;    Therapeutic Activity:     19     mins  02976;     Gait Training:           mins  99144;     Ultrasound:          mins  19951;    Ionto                                   mins  15214  Self Care                            mins  24188    Un-Timed:  Electrical Stimulation:         mins  37526 (MC );  Traction          mins 46373        Timed Treatment:   54   mins    Total Treatment:     69   mins      Mitchell Guzman, Physical Therapist Assistant  [place] License #: [id number]

## 2025-04-08 ENCOUNTER — TREATMENT (OUTPATIENT)
Dept: PHYSICAL THERAPY | Facility: CLINIC | Age: 75
End: 2025-04-08
Payer: MEDICARE

## 2025-04-08 DIAGNOSIS — M25.561 CHRONIC PAIN OF RIGHT KNEE: ICD-10-CM

## 2025-04-08 DIAGNOSIS — M25.651 DECREASED RANGE OF RIGHT HIP MOVEMENT: ICD-10-CM

## 2025-04-08 DIAGNOSIS — Z96.641 STATUS POST TOTAL REPLACEMENT OF RIGHT HIP: Primary | ICD-10-CM

## 2025-04-08 DIAGNOSIS — R29.898 IMPAIRED STRENGTH OF HIP MUSCLES: ICD-10-CM

## 2025-04-08 DIAGNOSIS — G89.29 CHRONIC PAIN OF RIGHT KNEE: ICD-10-CM

## 2025-04-08 PROCEDURE — 97110 THERAPEUTIC EXERCISES: CPT

## 2025-04-08 PROCEDURE — 97116 GAIT TRAINING THERAPY: CPT

## 2025-04-08 PROCEDURE — 97530 THERAPEUTIC ACTIVITIES: CPT

## 2025-04-08 NOTE — PROGRESS NOTES
Re-Assessment / Re-Certification    Time In 1404     Time Out 1450    Patient: Olga Peres   : 1950  Diagnosis/ICD-10 Code:  Status post total replacement of right hip [Z96.641]  Referring practitioner: ARTEMIO Argueta  Date of Initial Visit: Type: THERAPY  Noted: 3/3/2025  Today's Date: 2025  Patient seen for 10 sessions      Subjective:   Olga Peres reports: she is moving around the house better. Has not adhered to HEP. Rates pain typically 7/10 at R knee, no pain at R hip. Pain fluctuates and gets worse if she stays in the same position for too long.  Subjective Questionnaire: LEFS: NT  Clinical Progress: improved  Home Program Compliance: Yes  Treatment has included: therapeutic exercise, neuromuscular re-education, manual therapy, therapeutic activity, gait training, and cryotherapy       Objective          Active Range of Motion     Right Hip   Flexion: with pain    Passive Range of Motion     Right Hip   Flexion: 90 degrees with pain    Strength/Myotome Testing     Right Hip   Planes of Motion   Flexion: 3  Abduction: 4-  Adduction: 4    Left Knee   Normal strength    Right Knee   Flexion: 3+  Extension: 4-    Functional Assessment     Comments  5x STS test = 25s  TUG with walker = 18s    Continues to require active assist for exercises such as supine hip flexion. Displayed ability to SL hip abduct today.      Assessment/Plan  Pt tolerated today's treatment session well with no adverse responses during treatment session. Pt continues to display limitations including R hip strength and ROM deficits and knee strength deficits impacting her functional mobility. She has improved some since her IE but is still considered a falls risk based on functional testing. Pt will benefit from continued skilled PT services.   Progress toward previous goals: Partially Met    Goals  Plan Goals: Short Term Goals: 2-4 weeks. Patient will:  1. Be independent with initial HEP (met)  2. Be  instructed in posture and body mechanics (met)  3. Demonstrate TrA contraction during exercises in clinic without need for cueing. (Met)     Long Term Goals: 6-12 weeks. Patient will:  1. Demonstrate improved Right lower extremity/core MMT of >/= 4+/5 to allow for return to recreational/daily activities without increased pain. (Not met)  2. Demonstrate normal lower extremity flexibility to allow for walking/ADLs/performance of household tasks without pain.(Not met)  3. Have no soft tissue restriction in involved musculature. (Not met)  4. Report pain of </= 1/10 with all daily activities. (Not met)  6. Perceived disability </= 15% as measured on LEFS  7. Be independent with long term HEP    New Goals (to be met by DC)  Pt will improve 5x STS score to < 17 seconds to display improved LE strength for increased tolerance to ADLs.  Pt will improve TUG score to < 12 seconds to display improved gait speed for decreased falls risk.      Recommendations: Continue as planned  Timeframe: 2 months  Prognosis to achieve goals: fair    PT Signature: Chapito Mitchell, PT  [place] License #: [id number]    Based upon review of the patient's progress and continued therapy plan, it is my medical opinion that Olga Peres should continue physical therapy treatment at [facility]  [address] [place] [id number] [phone].    Signature: __________________________________  El Hubbard APRN    Manual Therapy:         mins  23817;  Therapeutic Exercise:    20     mins  45335;     Neuromuscular Deirdre:        mins  04285;    Therapeutic Activity:     10     mins  80016;     Gait Training:      10     mins  64911;     Ultrasound:          mins  83863;    Electrical Stimulation:         mins  32370 ( );  Dry Needling          mins self-pay    Timed Treatment:   40   mins   Total Treatment:     46   mins

## 2025-04-10 ENCOUNTER — TREATMENT (OUTPATIENT)
Dept: PHYSICAL THERAPY | Facility: CLINIC | Age: 75
End: 2025-04-10
Payer: MEDICARE

## 2025-04-10 DIAGNOSIS — R29.898 IMPAIRED STRENGTH OF HIP MUSCLES: ICD-10-CM

## 2025-04-10 DIAGNOSIS — Z96.641 STATUS POST TOTAL REPLACEMENT OF RIGHT HIP: Primary | ICD-10-CM

## 2025-04-10 DIAGNOSIS — M25.651 DECREASED RANGE OF RIGHT HIP MOVEMENT: ICD-10-CM

## 2025-04-10 DIAGNOSIS — M25.561 CHRONIC PAIN OF RIGHT KNEE: ICD-10-CM

## 2025-04-10 DIAGNOSIS — G89.29 CHRONIC PAIN OF RIGHT KNEE: ICD-10-CM

## 2025-04-10 NOTE — PROGRESS NOTES
"Physical Therapy Daily Treatment Note  [facility] [place]   [address], [address]  [place] [address]  P: [phone]  F: [phone]    Patient: Olga Peres   : 1950  Referring practitioner: ARTEMIO Argueta  Date of Initial Visit: Type: THERAPY  Noted: 3/3/2025  Today's Date: 4/10/2025  Patient seen for 11 sessions       Visit Diagnoses:    ICD-10-CM ICD-9-CM   1. Status post total replacement of right hip  Z96.641 V43.64   2. Impaired strength of hip muscles  R29.898 781.99   3. Decreased range of right hip movement  M25.651 719.55   4. Chronic pain of right knee  M25.561 719.46    G89.29 338.29     Daughter continues to serve as .    Olga Peres reports: she felt fine following last session. Is still having tenderness at her knee that she was having before her fall. Voices swelling has improved. Her daughter reports that she is \"much better\" than she was prior to her surgery, but she does have trouble with adherence of her HEP at home. Daughter voices her mother has tenderness at R knee and thigh when applying cream. F/u with surgeons office tomorrow.    Subjective     Objective          Ambulation     Ambulation: Stairs   Ascend stairs: contact guard assist  Ascending: Step-to pattern.  Descend stairs: contact guard assist  Pattern: non-reciprocal  Railings: two rails    Additional Stairs Ambulation Details  Pt displayed ability to ascend/descend 12 stairs with CGA/SBA and UE support on two rails with step-to gait pattern.    Functional Assessment     Comments  Pt displayed ability to step over 4 inch obstacle with vaulting gait pattern when leading with L, was unable to clear obstacle when leading with R and used circumduction compensation strategy to get around the ambrose.      See Exercise, Manual, and Modality Logs for complete treatment.       Assessment:  Pt tolerated today's treatment session well with no adverse " responses during treatment session. Pt continues to display limitations including LE strength deficits and gait deficits, but her strength appears to be improving evident by increased tolerance to walking and exercises in standing. Her weakness is still evident by inability to clear 4 inch obstacle with R LE without compensation strategies. Pt will benefit from continued skilled PT services.       Plan:  Progress per POC.         Timed:         Manual Therapy:         mins  50052;     Therapeutic Exercise:    14     mins  88725;     Neuromuscular Deirdre:    8    mins  27556;    Therapeutic Activity:     8     mins  67078;     Gait Training:      10     mins  99863;     Ultrasound:          mins  61365;    Ionto                                   mins  64172  Self Care                            mins  02939  Traction          mins 97248      Un-Timed:  Canalith Repos         mins 24757  Electrical Stimulation:         mins  29555 ( );  Dry Needling          mins self-pay  Traction          mins 57042        Timed Treatment:   40   mins   Total Treatment:     44   mins    Chapito Mitchell PT  [place] License #: [id number]    Physical Therapist

## 2025-04-11 ENCOUNTER — OFFICE VISIT (OUTPATIENT)
Dept: ORTHOPEDIC SURGERY | Facility: CLINIC | Age: 75
End: 2025-04-11
Payer: MEDICARE

## 2025-04-11 VITALS — HEIGHT: 61 IN | TEMPERATURE: 96.2 F | WEIGHT: 155.6 LBS | BODY MASS INDEX: 29.38 KG/M2

## 2025-04-11 DIAGNOSIS — Z96.641 S/P TOTAL RIGHT HIP ARTHROPLASTY: ICD-10-CM

## 2025-04-11 DIAGNOSIS — R52 PAIN: Primary | ICD-10-CM

## 2025-04-11 NOTE — PROGRESS NOTES
Olga Peres : 1950 MRN: [id number] DATE: 2025    DIAGNOSIS: 8 week follow up right total hip Anterior Approach    SUBJECTIVE:Patient returns today for 8 week follow up of right total hip replacement. Patient reports doing well with no unusual complaints in regards to her hip.  Patient states that she really has no pain in the hip however she has a moderate amount of pain and discomfort in her right knee.  Patient reports that she tripped and fell about 2 weeks ago bruising the knee.  Patient does report that she was also experiencing a moderate amount of knee pain prior to surgery as well and does not know if this is all from the fall.  She appears to be progressing appropriately and is using a walker due to the pain in the knee.  She denies any sign or symptoms of infection, and is without any other significant complaints today.    OBJECTIVE:   Exam:. The incision is healed. No sign of infection. Range of motion is progressing as expected. The calf is soft and nontender with a negative Homans sign. Strength progressing    DIAGNOSTIC STUDIES  Xrays: 2 views of the right hip (AP pelvis and lateral right hip) were ordered and reviewed for evaluation of recent hip replacement. They demonstrate a well positioned, well aligned hip replacement without complicating factors noted. In comparison with previous films there has been interval implant placement.    ASSESSMENT: 8 week status post right hip replacement Anterior Approach    PLAN: 1) Activity as tolerated   2) Continue hip strengthening exercises    3) Follow up 1 year post-op with repeat Xrays of right hip (2views AP Pelvis  and lateral left hip)    Plan to see the patient next week and further evaluate her right knee    ARTEMIO Argueta  2025    A  was utilized during this visit

## 2025-04-22 ENCOUNTER — OFFICE VISIT (OUTPATIENT)
Dept: ORTHOPEDIC SURGERY | Facility: CLINIC | Age: 75
End: 2025-04-22
Payer: MEDICARE

## 2025-04-22 VITALS — HEIGHT: 61 IN | BODY MASS INDEX: 29.47 KG/M2 | WEIGHT: 156.1 LBS | TEMPERATURE: 98.2 F

## 2025-04-22 DIAGNOSIS — M25.561 CHRONIC PAIN OF RIGHT KNEE: ICD-10-CM

## 2025-04-22 DIAGNOSIS — R52 PAIN: Primary | ICD-10-CM

## 2025-04-22 DIAGNOSIS — G89.29 CHRONIC PAIN OF RIGHT KNEE: ICD-10-CM

## 2025-04-22 DIAGNOSIS — M17.11 PRIMARY OSTEOARTHRITIS OF RIGHT KNEE: ICD-10-CM

## 2025-04-22 DIAGNOSIS — M22.2X1 PATELLOFEMORAL SYNDROME, RIGHT: ICD-10-CM

## 2025-04-22 RX ADMIN — LIDOCAINE HYDROCHLORIDE 5 ML: 10 INJECTION, SOLUTION EPIDURAL; INFILTRATION; INTRACAUDAL; PERINEURAL at 15:35

## 2025-04-22 RX ADMIN — METHYLPREDNISOLONE ACETATE 80 MG: 80 INJECTION, SUSPENSION INTRA-ARTICULAR; INTRALESIONAL; INTRAMUSCULAR; SOFT TISSUE at 15:35

## 2025-04-23 RX ORDER — METHYLPREDNISOLONE ACETATE 80 MG/ML
80 INJECTION, SUSPENSION INTRA-ARTICULAR; INTRALESIONAL; INTRAMUSCULAR; SOFT TISSUE
Status: COMPLETED | OUTPATIENT
Start: 2025-04-22 | End: 2025-04-22

## 2025-04-23 RX ORDER — LIDOCAINE HYDROCHLORIDE 10 MG/ML
5 INJECTION, SOLUTION EPIDURAL; INFILTRATION; INTRACAUDAL; PERINEURAL
Status: COMPLETED | OUTPATIENT
Start: 2025-04-22 | End: 2025-04-22

## 2025-04-23 NOTE — PROGRESS NOTES
Patient: Olga Peres  YOB: 1950 75 y.o. female  Medical Record Number: [id number]    Chief Complaints:   Chief Complaint   Patient presents with    Right Knee - Follow-up, Pain, Injections       History of Present Illness:Olga Peres is a 75 y.o. female who presents with complaints of having right knee pain that is chronic in nature.  Patient reports that she has been hurting for some time with her symptoms worsening about 2 weeks ago when she tripped and fell landing on the knee.  Patient states that the fall her knee pain is progressively worsened.  Currently rates her pain as a 7 on a scale of 10.  Describes it as a constant ache to the anterior portion of the knee.  Patient is able to ambulate full weightbearing walks unassisted in clinic today.  She denies any instability or buckling issues.  Denies any signs or symptoms of infection, and is without any other significant complaints today.    Allergies:   Allergies   Allergen Reactions    Penicillins Anaphylaxis    Sulfa Antibiotics Anaphylaxis       Medications:   Current Outpatient Medications   Medication Sig Dispense Refill    anastrozole (ARIMIDEX) 1 MG tablet Take 1 tablet by mouth Daily. 90 tablet 3    aspirin 81 MG EC tablet Take 1 tablet by mouth twice daily for 14 days; then take 1 daily for 28 days 60 tablet 0    atorvastatin (LIPITOR) 80 MG tablet Take 1 tablet by mouth Daily. 90 tablet 1    cetirizine (zyrTEC) 5 MG tablet Take 1 tablet by mouth Daily.      enalapril (VASOTEC) 20 MG tablet Take 2 tablets by mouth Daily. 180 tablet 3    HYDROcodone-acetaminophen (NORCO) 7.5-325 MG per tablet Take 1 tablet by mouth Every 4 (Four) Hours As Needed for Moderate Pain or Severe Pain for up to 30 doses. 30 tablet 0    levothyroxine (SYNTHROID, LEVOTHROID) 50 MCG tablet Take 1 tablet by mouth Every Morning. 90 tablet 3     No current facility-administered medications for this visit.         The following portions of  "the patient's history were reviewed and updated as appropriate: allergies, current medications, past family history, past medical history, past social history, past surgical history and problem list.    Review of Systems:   Pertinent positives/negatives listed in HPI above    Physical Exam:   Vitals:    04/22/25 1559   Temp: 98.2 °F (36.8 °C)   TempSrc: Temporal   Weight: 70.8 kg (156 lb 1.6 oz)   Height: 156 cm (61.42\")   PainSc: 7    PainLoc: Knee       General: A and O x 3, ASA, NAD      Knee Exam List: Knee:  right    ALIGNMENT:     Neutral  ,   Patella tracks   midline    GAIT:    Antalgic    SKIN:    No abnormality    RANGE OF MOTION:   Painful flexion    STRENGTH:   5 / 5    LIGAMENTS:    No varus / valgus instability.   Negative  Lachman.    MENISCUS:     Positive  medial   Katarina       DISTAL PULSES:    Paplable    DISTAL SENSATION :   Intact    LYMPHATICS:     No   lymphadenopathy    OTHER:          - Positive  effusion      - Positive crepitance with ROM           Radiology:  Xrays 3views right knee (ap,lateral, sunrise) were ordered and reviewed for evaluation of knee pain demonstrating moderate joint space narrowing noted to the patellofemoral compartment.  The medial and lateral compartment appear to be well-preserved.  No obvious sign of a fracture noted on x-ray films as well.  No other knee x-rays available for comparison purposes.    Assessment/Plan: Right knee pain/primary osteoarthritis right knee    Treatment option as well as imaging results were discussed in detail with the patient.  At this point in time organ to proceed forward with conservative measures.  We have educated the patient on the RICE method to help with inflammation and swelling.  I am also going to get her set up with outpatient physical therapy for range of motion and strengthening exercises.  Will also do an intra-articular joint injection today to calm her symptoms down.  She can follow back up as needed.    Large Joint " Arthrocentesis: R knee  Date/Time: 4/22/2025 3:35 PM  Consent given by: patient  Site marked: site marked  Timeout: Immediately prior to procedure a time out was called to verify the correct patient, procedure, equipment, support staff and site/side marked as required   Supporting Documentation  Indications: pain and joint swelling   Procedure Details  Location: knee - R knee  Preparation: Patient was prepped and draped in the usual sterile fashion  Needle size: 22 G  Approach: anterolateral  Medications administered: 80 mg methylPREDNISolone acetate 80 MG/ML; 5 mL lidocaine PF 1% 1 %  Patient tolerance: patient tolerated the procedure well with no immediate complications        El Hubbard, ARTEMIO  4/22/2025

## 2025-04-24 ENCOUNTER — TREATMENT (OUTPATIENT)
Dept: PHYSICAL THERAPY | Facility: CLINIC | Age: 75
End: 2025-04-24
Payer: MEDICARE

## 2025-04-24 DIAGNOSIS — R29.898 IMPAIRED STRENGTH OF HIP MUSCLES: ICD-10-CM

## 2025-04-24 DIAGNOSIS — M25.651 DECREASED RANGE OF RIGHT HIP MOVEMENT: ICD-10-CM

## 2025-04-24 DIAGNOSIS — M25.561 CHRONIC PAIN OF RIGHT KNEE: ICD-10-CM

## 2025-04-24 DIAGNOSIS — Z96.641 STATUS POST TOTAL REPLACEMENT OF RIGHT HIP: Primary | ICD-10-CM

## 2025-04-24 DIAGNOSIS — G89.29 CHRONIC PAIN OF RIGHT KNEE: ICD-10-CM

## 2025-04-24 NOTE — PROGRESS NOTES
Physical Therapy Daily Treatment Note  [facility] [place]   [address], [address]  [place] [address]  P: [phone]  F: [phone]    Patient: Olga Peres   : 1950  Referring practitioner: ARTEMIO Argueta  Date of Initial Visit: Type: THERAPY  Noted: 3/3/2025  Today's Date: 2025  Patient seen for 12 sessions       Visit Diagnoses:    ICD-10-CM ICD-9-CM   1. Status post total replacement of right hip  Z96.641 V43.64   2. Impaired strength of hip muscles  R29.898 781.99   3. Decreased range of right hip movement  M25.651 719.55   4. Chronic pain of right knee  M25.561 719.46    G89.29 338.29       Subjective     Olga Peres reports: she received an injection in her knee which has helped with the pain and swelling. Her R knee is sore from the needle.    Objective   See Exercise, Manual, and Modality Logs for complete treatment.       Assessment:  Pt continues to display deficits including R LE weakness and ROM deficits of her R hip and knee. HEP exercises were reviewed today to ensure proper technique. Pt educated again on importance of adhering to these exercises.      Plan:  Progress per POC.         Timed:         Manual Therapy:         mins  82380;     Therapeutic Exercise:    18     mins  93627;     Neuromuscular Deirdre:    11    mins  88348;    Therapeutic Activity:     12     mins  19513;     Gait Trainin     mins  77897;     Ultrasound:          mins  51603;    Ionto                                   mins  83973  Self Care                            mins  70736  Traction          mins 77768      Un-Timed:  Canalith Repos         mins 12707  Electrical Stimulation:         mins  14509 ( );  Dry Needling          mins self-pay  Traction          mins 17990        Timed Treatment:   46   mins   Total Treatment:     46   mins    Chapito Mitchell PT  [place] License #: [id number]    Physical Therapist

## 2025-04-29 ENCOUNTER — TREATMENT (OUTPATIENT)
Dept: PHYSICAL THERAPY | Facility: CLINIC | Age: 75
End: 2025-04-29
Payer: MEDICARE

## 2025-04-29 DIAGNOSIS — G89.29 CHRONIC PAIN OF RIGHT KNEE: ICD-10-CM

## 2025-04-29 DIAGNOSIS — R29.898 IMPAIRED STRENGTH OF HIP MUSCLES: ICD-10-CM

## 2025-04-29 DIAGNOSIS — M25.561 CHRONIC PAIN OF RIGHT KNEE: ICD-10-CM

## 2025-04-29 DIAGNOSIS — M25.651 DECREASED RANGE OF RIGHT HIP MOVEMENT: ICD-10-CM

## 2025-04-29 DIAGNOSIS — Z96.641 STATUS POST TOTAL REPLACEMENT OF RIGHT HIP: Primary | ICD-10-CM

## 2025-04-29 PROCEDURE — 97164 PT RE-EVAL EST PLAN CARE: CPT

## 2025-04-29 PROCEDURE — 97530 THERAPEUTIC ACTIVITIES: CPT

## 2025-04-29 PROCEDURE — 97112 NEUROMUSCULAR REEDUCATION: CPT

## 2025-04-29 NOTE — PROGRESS NOTES
Discharge Summary  Discharge Summary from Physical Therapy Report      Dates  PT visit: 3/3/25-4/29/25  Number of Visits: 13     Discharge Status of Patient: See MD Note dated 4/11/25    Goals: Partially Met    Discharge Plan: Continue with current home exercise program as instructed    Comments pt ready to be DC today. HEP reviewed in clinic.    Date of Discharge 4/29/25        Chapito Mitchell, PT  Physical Therapist

## 2025-04-29 NOTE — PATIENT INSTRUCTIONS
Access Code: [email or web address]  URL: [email or web address]  Date: 04/29/2025  Prepared by: Chapito Mitchell    Exercises  - Double Leg Bridge  - 1 x daily - 3 x weekly - 3 sets - 10 reps - 3s hold  - Seated Hip Adduction Squeeze with Ball  - 1 x daily - 3 x weekly - 3 sets - 10 reps - 3s hold  - Sidelying Hip Abduction  - 1 x daily - 3 x weekly - 3 sets - 10 reps  - Sit to Stand  - 1 x daily - 3 x weekly - 3 sets - 10 reps  - Heel Raises with Counter Support  - 1 x daily - 3 x weekly - 3 sets - 10 reps  - 3 way hip  - 1 x daily - 3 x weekly - 3 sets - 10 reps  - Standing Marching  - 1 x daily - 3 x weekly - 3 sets - 10 reps

## 2025-04-29 NOTE — PROGRESS NOTES
Physical Re-Evaluation Treatment Note  [facility] [place]   [address], [address]  [place] [address]  P: [phone]  F: [phone]    Patient: Olga Peres   : 1950  Referring practitioner: ARTEMIO Argueta  Date of Initial Visit: Type: THERAPY  Noted: 3/3/2025  Today's Date: 2025  Patient seen for 13 sessions       Visit Diagnoses:    ICD-10-CM ICD-9-CM   1. Status post total replacement of right hip  Z96.641 V43.64   2. Impaired strength of hip muscles  R29.898 781.99   3. Decreased range of right hip movement  M25.651 719.55   4. Chronic pain of right knee  M25.561 719.46    G89.29 338.29       Subjective   Pts daughter serves as .    Olga Peres reports: she is doing better since her hip replacement and is doing even better since her knee injection on the R. Voices no pain today and has had no hip pain recently. Knee pain has been managed since her recent injection. Voices she is moving around better at home.    Objective          Passive Range of Motion     Right Hip   Flexion: 90 degrees     Strength/Myotome Testing     Left Hip   Normal muscle strength  Planes of Motion   Flexion: WFL    Right Hip   Planes of Motion   Flexion: 3  Extension: 4  Abduction: 4+  Adduction: 4    Functional Assessment     Comments  5x STS score: 15s with UE support.    TUG score with AD = 15 seconds.      See Exercise, Manual, and Modality Logs for complete treatment.       Assessment:  Re-evaluation performed today due to change in pts POC as she will be discharged today. She has returned to her PLOF prior to surgery based on subjective reports and her daughter voices she is even moving better than she was. Her HEP was reviewed and revised today so she may continue safely working on her LE strength and balance at home independently.       Plan:  DC today.    Goals  Plan Goals: Short Term Goals: 2-4 weeks. Patient will:  1. Be independent  with initial HEP (met)  2. Be instructed in posture and body mechanics (met)  3. Demonstrate TrA contraction during exercises in clinic without need for cueing. (Met)     Long Term Goals: 6-12 weeks. Patient will:  1. Demonstrate improved Right lower extremity/core MMT of >/= 4+/5 to allow for return to recreational/daily activities without increased pain. (Not met)  2. Demonstrate normal lower extremity flexibility to allow for walking/ADLs/performance of household tasks without pain.(Not met)  3. Have no soft tissue restriction in involved musculature. (Not met)  4. Report pain of </= 1/10 with all daily activities. (met)  6. Perceived disability </= 15% as measured on LEFS  7. Be independent with long term HEP (met)     New Goals (to be met by DC)  Pt will improve 5x STS score to < 17 seconds to display improved LE strength for increased tolerance to ADLs. (Met)  Pt will improve TUG score to < 12 seconds to display improved gait speed for decreased falls risk. (Not met)        Timed:         Manual Therapy:         mins  33490;     Therapeutic Exercise:         mins  27038;     Neuromuscular Deirdre:    8    mins  73257;    Therapeutic Activity:     17     mins  78125;     Gait Training:           mins  26222;     Ultrasound:          mins  53016;    Ionto                                   mins  43177  Self Care                            mins  11174  Traction          mins 17029      Un-Timed:  Canalith Repos         mins 53118  Electrical Stimulation:         mins  04108 ( );  Dry Needling          mins self-pay  Traction          mins 82506  Re-Evaluation  __12__mins 47633        Timed Treatment:   25   mins   Total Treatment:     37   mins    Chapito Mitchell PT  [place] License #: [id number]    Physical Therapist

## 2025-05-08 ENCOUNTER — TELEPHONE (OUTPATIENT)
Dept: PHYSICAL THERAPY | Facility: CLINIC | Age: 75
End: 2025-05-08

## 2025-05-08 NOTE — TELEPHONE ENCOUNTER
Caller: ANTOINETTE HERNANDEZ    Relationship:  Emergency Contact    PATIENT CALLED REQUESTING TO CANCEL SAME DAY APPT.    Did the patient call AFTER the start time of their scheduled appointment?  []YES  [x]NO    Was the patient's appointment rescheduled? []YES  [x]NO    Any additional information: NOT FEELING WELL, PLEASE CANCEL LYFT AS WELL      APPT WAS SCHEDULED AS AND EVAL BUT IT LOOKS LIKE SHE'S ALREADY BEEN BEING SEEN, PLEASE CHANGE NEXT APPT TO AN EVAL IF NECESSARY

## 2025-05-15 ENCOUNTER — TREATMENT (OUTPATIENT)
Dept: PHYSICAL THERAPY | Facility: CLINIC | Age: 75
End: 2025-05-15
Payer: MEDICARE

## 2025-05-15 DIAGNOSIS — M25.561 CHRONIC PAIN OF RIGHT KNEE: Primary | ICD-10-CM

## 2025-05-15 DIAGNOSIS — G89.29 CHRONIC PAIN OF RIGHT KNEE: Primary | ICD-10-CM

## 2025-05-15 DIAGNOSIS — R68.89 ACTIVITY INTOLERANCE: ICD-10-CM

## 2025-05-15 DIAGNOSIS — M25.661 DECREASED RANGE OF MOTION (ROM) OF RIGHT KNEE: ICD-10-CM

## 2025-05-15 NOTE — PROGRESS NOTES
"    Physical Therapy Initial Evaluation and Plan of Care  Clinic Location [address] [facility] [address], [place] [id number]    Patient: Olga Peres   : 1950  Diagnosis/ICD-10 Code:  Chronic pain of right knee [M25.561, G89.29]  Referring practitioner: ARTEMIO Argueta  Date of Initial Visit: 5/15/2025  Today's Date: 5/15/2025  Patient seen for 1 session         Visit Diagnoses:    ICD-10-CM ICD-9-CM   1. Chronic pain of right knee  M25.561 719.46    G89.29 338.29   2. Decreased range of motion (ROM) of right knee  M25.661 719.56   3. Activity intolerance  R68.89 780.99         Subjective Questionnaire: WOMAC Score: 30/96      Subjective Evaluation    History of Present Illness  Mechanism of injury: Pt accompanied today by her daughter and her daughter's , who served as translators for today's visit.    Pt reports increase of R knee pain since her recent R hip replacement on 2/10/2025. She received an injection in the knee which has helped with pain, but pain will still increase with activity. Typically uses a walker, but does not use a walker when walking around the house. She admits to R knee pain prior to the hip replacement and was even having some falls. Pain increases with activity and can radiate throughout the entire R LE when she walks a lot. CC is difficulty with climbing stairs and getting her into a car due to limited knee motion.    Voices her R hip is now doing \"perfect, no pain whatsoever\". Reports her hip is doing better following the surgery than it was prior to the surgery.    Pain  Current pain ratin  At worst pain ratin  Location: R LE  Quality: dull ache and radiating  Aggravating factors: ambulation, prolonged positioning, standing and repetitive movement    Social Support  Lives with: adult children    Diagnostic Tests  X-ray: abnormal    Treatments  Previous treatment: injection treatment  Patient Goals  Patient goals for therapy: increased strength, " independence with ADLs/IADLs and increased motion         Radiology:  Xrays 3views right knee (ap,lateral, sunrise) were ordered and reviewed for evaluation of knee pain demonstrating moderate joint space narrowing noted to the patellofemoral compartment.  The medial and lateral compartment appear to be well-preserved.  No obvious sign of a fracture noted on x-ray films as well.  No other knee x-rays available for comparison purposes.     Objective          Observations   Left Knee   Positive for edema.       Tenderness     Right Knee   Tenderness in the lateral joint line, medial joint line, patellar tendon and popliteal fossa.     Neurological Testing     Sensation     Knee   Left Knee   Intact: Light touch    Right Knee   Intact: light touch     Active Range of Motion   Left Knee   Normal active range of motion    Right Knee   Flexion: 90 degrees   Extension: WFL    Patellar Mobility   Left Knee Hypomobile in the left medial, left lateral, left superior and left inferior patellar tendon(s).     Right Knee Patellar tendons within functional limits include the superior and inferior. Hypomobile in the medial and lateral patellar tendon(s).     Strength/Myotome Testing     Left Hip   Planes of Motion   Flexion: 4+    Right Hip   Planes of Motion   Flexion: 3    Left Knee   Flexion: 4+  Extension: 4+    Right Knee   Flexion: 4-  Extension: 4-    Left Knee Flexibility Comments:   Hamstring extensibility WNL.    Right Knee Flexibility Comments:   Hamstring extensibility WNL.    Ambulation   Weight-Bearing Status   Weight-Bearing Status (Right): weight-bearing as tolerated    Assistive device used: two-wheeled walker    Ambulation: Level Surfaces   Ambulation with assistive device: independent    Observational Gait   Decreased walking speed and stride length.           Assessment & Plan       Assessment  Impairments: abnormal gait, abnormal or restricted ROM, activity intolerance, impaired balance, impaired physical  strength, lacks appropriate home exercise program, pain with function, safety issue and weight-bearing intolerance   Functional limitations: lifting, walking, uncomfortable because of pain and standing   Assessment details: The patient is a 75 y.o. female who presents to physical therapy today for R knee pain. Upon initial evaluation, the patient demonstrates the following impairments: knee pain, TTP, knee ROM deficits, knee strength deficits, gait deficits, and activity intolerance. Examination findings indicate S/S consistent with referring dx of OA of R knee.     Due to these impairments, the patient is unable to perform or has difficulty with the following functional tasks: walking, climbing stairs, prolonged standing, stepping over obstacles, and her normal household and recreational activities. The patient would benefit from skilled PT services to address functional limitations and impairments and to improve patient quality of life.      Prognosis: good    Goals  Plan Goals: STG:  Pt will be independent and compliant with initial HEP in 4 weeks.  Pt will report a 30% improvement in symptoms since starting therapy in 4 weeks.  Pt will demonstrate an increase in R knee flexion ROM to 100 degrees within 4 weeks.   Pt will report pain level at worst <5 during walking activity in 4 weeks.    LTG: by DC (12 weeks)  Pt will be independent with final HEP for self-management of condition by DC.  Pt will improve score on WOMAC to less than 20/96 by DC.   Pt will report a 65% improvement in symptoms by DC in order to allow return to PLOF.  Pt will improve knee flexion to at least 115 deg in order to be able to better tolerate functional activities by DC.  Pt will improve R knee strength to at least 4+/5 in order to be able to tolerate ADL demands without-pain by DC.  improve ability to walk on uneven surfaces.     Plan  Therapy options: will be seen for skilled therapy services  Planned modality interventions:  cryotherapy, dry needling, electrical stimulation/Russian stimulation, high voltage pulsed current (pain management), TENS, iontophoresis and thermotherapy (hydrocollator packs)  Planned therapy interventions: soft tissue mobilization, postural training, neuromuscular re-education, motor coordination training, manual therapy, transfer training, therapeutic activities, stretching, strengthening, spinal/joint mobilization, joint mobilization, IADL retraining, home exercise program, gait training, functional ROM exercises, flexibility, body mechanics training, ADL retraining and balance/weight-bearing training  Other planned therapy interventions: Group Therapy  Frequency: 2x week  Duration in weeks: 8  Treatment plan discussed with: patient            Timed:         Manual Therapy:         mins  37502;     Therapeutic Exercise:    15     mins  26934;     Neuromuscular Deirdre:        mins  65004;    Therapeutic Activity:          mins  82170;     Gait Training:           mins  30176;     Ultrasound:          mins  14245;    Ionto                                   mins   54803  Self Care                       10     mins   27656        Un-Timed:  Electrical Stimulation:         mins  30202 ( );  Dry Needling          mins self-pay  Traction          mins 24323  Low Eval     18     Mins  56692  Mod Eval          Mins  92380  High Eval                            Mins  03770  Canalith Repos         mins 56265        Timed Treatment:   25   mins   Total Treatment:     43   mins          PT: Chapito Mitchell PT     [place] License #: [id number]  Electronically signed by Chapito Mitchell PT, 05/15/25, 2:26 PM EDT    Certification Period: 5/15/2025 thru 8/12/2025  I certify that the therapy services are furnished while this patient is under my care.  The services outlined above are required by this patient, and will be reviewed every 90 days.         Physician Signature:__________________________________________________    PHYSICIAN:  El Hubbard, ARTEMIO  NPI: [id number]                                      DATE:      Please sign and return via fax to [email or web address] . Thank you, [facility].

## 2025-05-15 NOTE — PATIENT INSTRUCTIONS
Access Code: [email or web address]  URL: [email or web address]  Date: 05/15/2025  Prepared by: Chapito Mitchell    Exercises  - Supine Heel Slide with Strap  - 3 x daily - 7 x weekly - 1 sets - 10 reps - 5-10 seconds hold  - Supine Active Straight Leg Raise  - 1 x daily - 7 x weekly - 3 sets - 10 reps  - Double Leg Bridge  - 1 x daily - 7 x weekly - 3 sets - 10 reps - 3 seconds hold  - Seated Long Arc Quad  - 1 x daily - 7 x weekly - 3 sets - 10 reps - 1-2 seconds hold

## 2025-05-20 ENCOUNTER — TREATMENT (OUTPATIENT)
Dept: PHYSICAL THERAPY | Facility: CLINIC | Age: 75
End: 2025-05-20
Payer: MEDICARE

## 2025-05-20 DIAGNOSIS — G89.29 CHRONIC PAIN OF RIGHT KNEE: Primary | ICD-10-CM

## 2025-05-20 DIAGNOSIS — M25.561 CHRONIC PAIN OF RIGHT KNEE: Primary | ICD-10-CM

## 2025-05-20 DIAGNOSIS — R68.89 ACTIVITY INTOLERANCE: ICD-10-CM

## 2025-05-20 DIAGNOSIS — M25.661 DECREASED RANGE OF MOTION (ROM) OF RIGHT KNEE: ICD-10-CM

## 2025-05-20 NOTE — PROGRESS NOTES
Physical Therapy Daily Treatment Note  [facility] [place]   [address], [address]  [place] [address]  P: [phone]  F: [phone]    Patient: Olga Peres   : 1950  Referring practitioner: ARTEMIO Argueta  Date of Initial Visit: Type: THERAPY  Noted: 5/15/2025  Today's Date: 2025  Patient seen for 2 sessions       Visit Diagnoses:    ICD-10-CM ICD-9-CM   1. Chronic pain of right knee  M25.561 719.46    G89.29 338.29   2. Decreased range of motion (ROM) of right knee  M25.661 719.56   3. Activity intolerance  R68.89 780.99       Subjective   [employer]  ID: [id number]    Olga Peres reports: her  was late today when she was ready. She has adhered to her HEP and her knee is feeling better. No new complaints.    Objective   See Exercise, Manual, and Modality Logs for complete treatment.       Assessment:  Pt tolerated today's treatment session well with no adverse responses during treatment session. Pt continues to display limitations including LE strength deficits. Session cut short due to pts late arrival. Pt will benefit from continued skilled PT services.       Plan:  Progress per POC.         Timed:         Manual Therapy:         mins  56918;     Therapeutic Exercise:    15     mins  91132;     Neuromuscular Deirdre:        mins  67571;    Therapeutic Activity:     10     mins  41953;     Gait Training:           mins  69006;     Ultrasound:          mins  04925;    Ionto                                   mins  40551  Self Care                            mins  35631  Traction          mins 14785      Un-Timed:  Canalith Repos         mins 23958  Electrical Stimulation:         mins  59462 ( );  Dry Needling          mins self-pay  Traction          mins 14161        Timed Treatment:   25   mins   Total Treatment:     35   mins    Chapito Mitchell PT  [place] License #: [id number]    Physical Therapist

## 2025-05-22 ENCOUNTER — TREATMENT (OUTPATIENT)
Dept: PHYSICAL THERAPY | Facility: CLINIC | Age: 75
End: 2025-05-22
Payer: MEDICARE

## 2025-05-22 DIAGNOSIS — M25.661 DECREASED RANGE OF MOTION (ROM) OF RIGHT KNEE: ICD-10-CM

## 2025-05-22 DIAGNOSIS — R68.89 ACTIVITY INTOLERANCE: ICD-10-CM

## 2025-05-22 DIAGNOSIS — Z96.641 STATUS POST TOTAL REPLACEMENT OF RIGHT HIP: ICD-10-CM

## 2025-05-22 DIAGNOSIS — G89.29 CHRONIC PAIN OF RIGHT KNEE: Primary | ICD-10-CM

## 2025-05-22 DIAGNOSIS — R29.898 IMPAIRED STRENGTH OF HIP MUSCLES: ICD-10-CM

## 2025-05-22 DIAGNOSIS — M25.561 CHRONIC PAIN OF RIGHT KNEE: Primary | ICD-10-CM

## 2025-05-22 DIAGNOSIS — M25.651 DECREASED RANGE OF RIGHT HIP MOVEMENT: ICD-10-CM

## 2025-05-22 PROCEDURE — 97530 THERAPEUTIC ACTIVITIES: CPT

## 2025-05-22 PROCEDURE — 97110 THERAPEUTIC EXERCISES: CPT

## 2025-05-22 NOTE — PROGRESS NOTES
Physical Therapy Daily Treatment Note  [facility] [place]   [address], [address]  [place] [address]  P: [phone]  F: [phone]    Patient: Olga Peres   : 1950  Referring practitioner: ARTEMIO Argueta  Date of Initial Visit: Type: THERAPY  Noted: 5/15/2025  Today's Date: 2025  Patient seen for 3 sessions       Visit Diagnoses:    ICD-10-CM ICD-9-CM   1. Chronic pain of right knee  M25.561 719.46    G89.29 338.29   2. Decreased range of motion (ROM) of right knee  M25.661 719.56   3. Activity intolerance  R68.89 780.99   4. Status post total replacement of right hip  Z96.641 V43.64   5. Impaired strength of hip muscles  R29.898 781.99   6. Decreased range of right hip movement  M25.651 719.55         Subjective     Olga Peres reports: My hip is doing much better. I used to not be able to bend my knee at all. Now I can.    Olga arrived without a relative to translate. A phone  was used throughout this visit to translate.     Objective   See Exercise, Manual, and Modality Logs for complete treatment.       Assessment:  Pt's R quad activation has improved greatly over the past several visits with AROM, improving her ability to perform sit<>supine<>sit transfers on mat table. Leg press added this visit for increased B quad and glute strength. Pt tolerated increased resistance for LAQ exercise, without adverse effects.         Plan:  Progress per Plan of Care            Timed:         Manual Therapy:         mins  63829;     Therapeutic Exercise:    15     mins  31719;     Neuromuscular Deirdre:        mins  75479;    Therapeutic Activity:     15     mins  54269;     Gait Training:           mins  88682;     Ultrasound:          mins  46046;    Ionto                                   mins  06157  Self Care                            mins  37636    Un-Timed:  Electrical Stimulation:         mins  38369 (  );  Traction          mins 17917        Timed Treatment:   30   mins   Total Treatment:     56   mins      Mitchell Guzman, Physical Therapist Assistant  [place] License #: [id number]

## 2025-05-29 ENCOUNTER — TREATMENT (OUTPATIENT)
Dept: PHYSICAL THERAPY | Facility: CLINIC | Age: 75
End: 2025-05-29
Payer: MEDICARE

## 2025-05-29 DIAGNOSIS — M25.561 CHRONIC PAIN OF RIGHT KNEE: Primary | ICD-10-CM

## 2025-05-29 DIAGNOSIS — M25.661 DECREASED RANGE OF MOTION (ROM) OF RIGHT KNEE: ICD-10-CM

## 2025-05-29 DIAGNOSIS — R68.89 ACTIVITY INTOLERANCE: ICD-10-CM

## 2025-05-29 DIAGNOSIS — G89.29 CHRONIC PAIN OF RIGHT KNEE: Primary | ICD-10-CM

## 2025-05-29 NOTE — PROGRESS NOTES
Physical Therapy Daily Treatment Note  [facility] [place]   [address], [address]  [place] [address]  P: [phone]  F: [phone]    Patient: Olga Peres   : 1950  Referring practitioner: ARTEMIO Argueta  Date of Initial Visit: Type: THERAPY  Noted: 5/15/2025  Today's Date: 2025  Patient seen for 4 sessions       Visit Diagnoses:    ICD-10-CM ICD-9-CM   1. Chronic pain of right knee  M25.561 719.46    G89.29 338.29   2. Decreased range of motion (ROM) of right knee  M25.661 719.56   3. Activity intolerance  R68.89 780.99       Subjective     LLS  ID: [id number]    Olga Peres reports: she is feeling fine since last session, no new complaints. Has adhered to HEP.    Objective   See Exercise, Manual, and Modality Logs for complete treatment.       Assessment:  Pt tolerated today's treatment session well with no adverse responses during treatment session. Pt continues to display limitations including LE strength deficits but appears to be improving based on exercise progression in the clinic. Pt will benefit from continued skilled PT services.       Plan:  Progress per POC.         Timed:         Manual Therapy:         mins  04524;     Therapeutic Exercise:    20     mins  01592;     Neuromuscular Deirdre:    10    mins  97223;    Therapeutic Activity:     8     mins  94573;     Gait Training:           mins  64893;     Ultrasound:          mins  51049;    Ionto                                   mins  44252  Self Care                            mins  16836  Traction          mins 20681      Un-Timed:  Canalith Repos         mins 55328  Electrical Stimulation:         mins  55738 ( );  Dry Needling          mins self-pay  Traction          mins 76933        Timed Treatment:   38   mins   Total Treatment:     43   mins    Chapito Mitchell PT  [place] License #: [id number]    Physical Therapist

## 2025-06-05 ENCOUNTER — TREATMENT (OUTPATIENT)
Dept: PHYSICAL THERAPY | Facility: CLINIC | Age: 75
End: 2025-06-05
Payer: MEDICARE

## 2025-06-05 DIAGNOSIS — G89.29 CHRONIC PAIN OF RIGHT KNEE: Primary | ICD-10-CM

## 2025-06-05 DIAGNOSIS — R68.89 ACTIVITY INTOLERANCE: ICD-10-CM

## 2025-06-05 DIAGNOSIS — M25.661 DECREASED RANGE OF MOTION (ROM) OF RIGHT KNEE: ICD-10-CM

## 2025-06-05 DIAGNOSIS — M25.561 CHRONIC PAIN OF RIGHT KNEE: Primary | ICD-10-CM

## 2025-06-05 NOTE — PROGRESS NOTES
Physical Therapy Daily Treatment Note  [facility] [place]   [address], [address]  [place] [address]  P: [phone]  F: [phone]    Patient: Olga Peres   : 1950  Referring practitioner: ARTEMIO Argueta  Date of Initial Visit: Type: THERAPY  Noted: 5/15/2025  Today's Date: 2025  Patient seen for 5 sessions       Visit Diagnoses:    ICD-10-CM ICD-9-CM   1. Chronic pain of right knee  M25.561 719.46    G89.29 338.29   2. Decreased range of motion (ROM) of right knee  M25.661 719.56   3. Activity intolerance  R68.89 780.99       Subjective   [employer] ID: [id number]    Olga Peres reports: she has been exercising more and is walking around her house without the walker.    Objective   See Exercise, Manual, and Modality Logs for complete treatment.       Assessment:  Pt tolerated today's treatment session well with no adverse responses during treatment session. Pt continues to display limitations including LE strength deficits but is progressing evident by her ability to ambulate in the clinic without an AD. Standing marching and SL hip abduction added HEP Pt will benefit from continued skilled PT services.       Plan:  Progress per POC.         Timed:         Manual Therapy:         mins  07522;     Therapeutic Exercise:    15     mins  96419;     Neuromuscular Deirdre:    10    mins  37550;    Therapeutic Activity:          mins  19975;     Gait Training:           mins  44268;     Ultrasound:          mins  40857;    Ionto                                   mins  51252  Self Care                            mins  67539  Traction          mins 79671      Un-Timed:  Canalith Repos         mins 08338  Electrical Stimulation:         mins  92832 ( );  Dry Needling          mins self-pay  Traction          mins 64312        Timed Treatment:   25   mins   Total Treatment:     30   mins    Chapito Mitchell PT  [place] License #:  [id number]    Physical Therapist

## 2025-06-10 ENCOUNTER — TREATMENT (OUTPATIENT)
Dept: PHYSICAL THERAPY | Facility: CLINIC | Age: 75
End: 2025-06-10
Payer: MEDICARE

## 2025-06-10 DIAGNOSIS — R68.89 ACTIVITY INTOLERANCE: ICD-10-CM

## 2025-06-10 DIAGNOSIS — G89.29 CHRONIC PAIN OF RIGHT KNEE: Primary | ICD-10-CM

## 2025-06-10 DIAGNOSIS — M25.561 CHRONIC PAIN OF RIGHT KNEE: Primary | ICD-10-CM

## 2025-06-10 DIAGNOSIS — M25.661 DECREASED RANGE OF MOTION (ROM) OF RIGHT KNEE: ICD-10-CM

## 2025-06-10 PROCEDURE — 97112 NEUROMUSCULAR REEDUCATION: CPT

## 2025-06-10 PROCEDURE — 97110 THERAPEUTIC EXERCISES: CPT

## 2025-06-10 NOTE — PROGRESS NOTES
Physical Therapy Daily Treatment Note  [facility] [place]   [address], [address]  [place] [address]  P: [phone]  F: [phone]    Patient: Olga Peres   : 1950  Referring practitioner: ARTEMIO Argueta  Date of Initial Visit: Type: THERAPY  Noted: 5/15/2025  Today's Date: 6/10/2025  Patient seen for 6 sessions       Visit Diagnoses:    ICD-10-CM ICD-9-CM   1. Chronic pain of right knee  M25.561 719.46    G89.29 338.29   2. Decreased range of motion (ROM) of right knee  M25.661 719.56   3. Activity intolerance  R68.89 780.99       Subjective     [employer]  ID: [id number]    Olga Peres reports: no new complaints, has been getting stronger.    Objective   See Exercise, Manual, and Modality Logs for complete treatment.       Assessment:  Pt tolerated today's treatment session well with no adverse responses during treatment session. Pt continues to display limitations including LE strength and balance deficits impacting her tolerance to ADLs, but has been improving. Pt will benefit from continued skilled PT services.       Plan:  Progress per POC.         Timed:         Manual Therapy:         mins  49381;     Therapeutic Exercise:    27     mins  60130;     Neuromuscular Deirdre:    10    mins  91033;    Therapeutic Activity:     10     mins  30923;     Gait Training:           mins  23321;     Ultrasound:          mins  66770;    Ionto                                   mins  42852  Self Care                            mins  74341  Traction          mins 18789      Un-Timed:  Canalith Repos         mins 23263  Electrical Stimulation:         mins  10785 ( );  Dry Needling          mins self-pay  Traction          mins 52153        Timed Treatment:   47   mins   Total Treatment:     47   mins    Chapito Mitchell PT  [place] License #: [id number]    Physical Therapist

## 2025-06-12 ENCOUNTER — TREATMENT (OUTPATIENT)
Dept: PHYSICAL THERAPY | Facility: CLINIC | Age: 75
End: 2025-06-12
Payer: MEDICARE

## 2025-06-12 DIAGNOSIS — G89.29 CHRONIC PAIN OF RIGHT KNEE: Primary | ICD-10-CM

## 2025-06-12 DIAGNOSIS — M25.561 CHRONIC PAIN OF RIGHT KNEE: Primary | ICD-10-CM

## 2025-06-12 DIAGNOSIS — R68.89 ACTIVITY INTOLERANCE: ICD-10-CM

## 2025-06-12 DIAGNOSIS — M25.661 DECREASED RANGE OF MOTION (ROM) OF RIGHT KNEE: ICD-10-CM

## 2025-06-12 NOTE — PROGRESS NOTES
Physical Therapy Daily Treatment Note  [facility] Physical Therapy [place]   [address], [address]  [place] [address]  P: [phone]  F: [phone]    Patient: Olga Peres   : 1950  Referring practitioner: ARTEMIO Argueta  Date of Initial Visit: Type: THERAPY  Noted: 5/15/2025  Today's Date: 2025  Patient seen for 7 sessions       Visit Diagnoses:    ICD-10-CM ICD-9-CM   1. Chronic pain of right knee  M25.561 719.46    G89.29 338.29   2. Decreased range of motion (ROM) of right knee  M25.661 719.56   3. Activity intolerance  R68.89 780.99       Subjective     [employer]  ID: [id number]    Olga Peres reports: no knee pain today. No new reports.    Objective   See Exercise, Manual, and Modality Logs for complete treatment.       Assessment:  Pt tolerated today's treatment session well with no adverse responses during treatment session. Pt continues to display limitations including LE strength deficits, but she appears to be progressing well. Pt will benefit from continued skilled PT services.       Plan:  Progress per POC.         Timed:         Manual Therapy:         mins  16850;     Therapeutic Exercise:     20    mins  20025;     Neuromuscular Deirdre:    17    mins  07194;    Therapeutic Activity:     8     mins  82566;     Gait Training:           mins  49399;     Ultrasound:          mins  89562;    Ionto                                   mins  37314  Self Care                            mins  46574  Traction          mins 01543      Un-Timed:  Canalith Repos         mins 72030  Electrical Stimulation:         mins  59060 ( );  Dry Needling          mins self-pay  Traction          mins 55793        Timed Treatment:   45   mins   Total Treatment:     45   mins    Chapito Mitchell PT  [place] License #: [id number]    Physical Therapist

## 2025-06-18 ENCOUNTER — TREATMENT (OUTPATIENT)
Dept: PHYSICAL THERAPY | Facility: CLINIC | Age: 75
End: 2025-06-18
Payer: MEDICARE

## 2025-06-18 DIAGNOSIS — M25.661 DECREASED RANGE OF MOTION (ROM) OF RIGHT KNEE: ICD-10-CM

## 2025-06-18 DIAGNOSIS — M25.561 CHRONIC PAIN OF RIGHT KNEE: Primary | ICD-10-CM

## 2025-06-18 DIAGNOSIS — R68.89 ACTIVITY INTOLERANCE: ICD-10-CM

## 2025-06-18 DIAGNOSIS — G89.29 CHRONIC PAIN OF RIGHT KNEE: Primary | ICD-10-CM

## 2025-06-18 NOTE — PROGRESS NOTES
Re-Assessment / Re-Certification    Time In 1337     Time Out 1421    Patient: Olga Peres   : 1950  Diagnosis/ICD-10 Code:  Chronic pain of right knee [M25.561, G89.29]  Referring practitioner: ARTEMIO Argueta  Date of Initial Visit: Type: THERAPY  Noted: 5/15/2025  Today's Date: 2025  Patient seen for 8 sessions      Subjective:   [employer]  ID: [id number]    Olga Peres reports: her knee is feeling very good. Reports she still has issues with lifting her R leg. Voices she has improved a lot. Reports no more knee pain. Voices near 100% improvement.  Subjective Questionnaire: WOMAC Score: NT today due to translation issues.  Clinical Progress: improved  Home Program Compliance: Yes  Treatment has included: therapeutic exercise, neuromuscular re-education, manual therapy, therapeutic activity, gait training, and cryotherapy       Objective          Active Range of Motion     Right Hip   Flexion: 115 degrees   Left Knee   Normal active range of motion  Flexion: 120 degrees     Right Knee   Flexion: 108 degrees   Extension: WFL    Strength/Myotome Testing     Left Hip   Planes of Motion   Flexion: 4+  Abduction: 4+  Adduction: 4+    Right Hip   Planes of Motion   Flexion: 4  Abduction: 4+  Adduction: 4+    Left Knee   Flexion: 5  Extension: 5    Right Knee   Flexion: 5  Extension: 5    Functional Assessment     Comments  5x STS score = 12 seconds with UE support.    TUG score no AD = 11.5 seconds.      Assessment/Plan  Pt tolerated today's treatment session well with no adverse responses during treatment session. Pt continues to display limitations including LE strength and R hip flexion ROM deficits but she has progressed well. Pt will benefit from continued skilled PT services.   Progress toward previous goals: Partially Met    Goals  Plan Goals: STG:  Pt will be independent and compliant with initial HEP in 4 weeks.  Pt will report a 30% improvement in  symptoms since starting therapy in 4 weeks.  Pt will demonstrate an increase in R knee flexion ROM to 100 degrees within 4 weeks.   Pt will report pain level at worst <5 during walking activity in 4 weeks.  (ALL STG MET)     LTG: by DC (12 weeks)  Pt will be independent with final HEP for self-management of condition by DC. (Not met)  Pt will improve score on WOMAC to less than 20/96 by DC. (NT)  Pt will report a 65% improvement in symptoms by DC in order to allow return to PLOF. (Met)  Pt will improve knee flexion to at least 115 deg in order to be able to better tolerate functional activities by DC. (Not met)  Pt will improve R knee strength to at least 4+/5 in order to be able to tolerate ADL demands without-pain by DC. (Met)        Recommendations: Continue as planned  Timeframe: 1 month  Prognosis to achieve goals: good    PT Signature: Chapito Mitchell, PT  [place] License #: [id number]    Based upon review of the patient's progress and continued therapy plan, it is my medical opinion that Olga Jose Peres should continue physical therapy treatment at [facility]  [address] [place] [id number] [phone].    Signature: __________________________________  El Hubbard APRN    Manual Therapy:         mins  36448;  Therapeutic Exercise:    18     mins  92998;     Neuromuscular Deirdre:    9    mins  77019;    Therapeutic Activity:     17     mins  31152;     Gait Training:           mins  42905;     Ultrasound:          mins  24707;    Electrical Stimulation:         mins  89707 ( );  Dry Needling          mins self-pay    Timed Treatment:   44   mins   Total Treatment:     44   mins

## 2025-06-20 ENCOUNTER — TREATMENT (OUTPATIENT)
Dept: PHYSICAL THERAPY | Facility: CLINIC | Age: 75
End: 2025-06-20
Payer: MEDICARE

## 2025-06-20 DIAGNOSIS — G89.29 CHRONIC PAIN OF RIGHT KNEE: Primary | ICD-10-CM

## 2025-06-20 DIAGNOSIS — R68.89 ACTIVITY INTOLERANCE: ICD-10-CM

## 2025-06-20 DIAGNOSIS — M25.661 DECREASED RANGE OF MOTION (ROM) OF RIGHT KNEE: ICD-10-CM

## 2025-06-20 DIAGNOSIS — M25.561 CHRONIC PAIN OF RIGHT KNEE: Primary | ICD-10-CM

## 2025-06-20 NOTE — PROGRESS NOTES
Physical Therapy Daily Treatment Note  [facility] [place]   [address], [address]  [place] [address]  P: [phone]  F: [phone]    Patient: Olga Peres   : 1950  Referring practitioner: ARTEMIO Argueta  Date of Initial Visit: Type: THERAPY  Noted: 5/15/2025  Today's Date: 2025  Patient seen for 9 sessions       Visit Diagnoses:    ICD-10-CM ICD-9-CM   1. Chronic pain of right knee  M25.561 719.46    G89.29 338.29   2. Decreased range of motion (ROM) of right knee  M25.661 719.56   3. Activity intolerance  R68.89 780.99       Subjective   Language Line  ID: [id number]    Olga Peres reports: no new complaints. Has been riding her bike at home.    Objective   See Exercise, Manual, and Modality Logs for complete treatment.       Assessment:  Pt tolerated today's treatment session well with no adverse responses during treatment session. Pt continues to display limitations including LE strength deficits but continues to display signs of progression evident by her exercise tolerance. Pt will benefit from continued skilled PT services.       Plan:  Progress per POC.         Timed:         Manual Therapy:         mins  32236;     Therapeutic Exercise:    15     mins  28436;     Neuromuscular Deirdre:    10    mins  00723;    Therapeutic Activity:     15     mins  96286;     Gait Training:           mins  38503;     Ultrasound:          mins  81841;    Ionto                                   mins  01281  Self Care                            mins  01235  Traction          mins 70799      Un-Timed:  Canalith Repos         mins 27523  Electrical Stimulation:         mins  28887 ( );  Dry Needling          mins self-pay  Traction          mins 26306        Timed Treatment:   40   mins   Total Treatment:     40   mins    Chapito Mitchell PT  [place] License #: [id number]    Physical Therapist

## 2025-06-24 ENCOUNTER — TREATMENT (OUTPATIENT)
Dept: PHYSICAL THERAPY | Facility: CLINIC | Age: 75
End: 2025-06-24
Payer: MEDICARE

## 2025-06-24 DIAGNOSIS — M25.661 DECREASED RANGE OF MOTION (ROM) OF RIGHT KNEE: ICD-10-CM

## 2025-06-24 DIAGNOSIS — M25.561 CHRONIC PAIN OF RIGHT KNEE: Primary | ICD-10-CM

## 2025-06-24 DIAGNOSIS — G89.29 CHRONIC PAIN OF RIGHT KNEE: Primary | ICD-10-CM

## 2025-06-24 DIAGNOSIS — R68.89 ACTIVITY INTOLERANCE: ICD-10-CM

## 2025-06-24 PROCEDURE — 97112 NEUROMUSCULAR REEDUCATION: CPT

## 2025-06-24 PROCEDURE — 97110 THERAPEUTIC EXERCISES: CPT

## 2025-06-24 NOTE — PROGRESS NOTES
Physical Therapy Daily Treatment Note  [facility] [place]   [address], [address]  [place] [address]  P: [phone]  F: [phone]    Patient: Olga Peres   : 1950  Referring practitioner: ARTEMIO Argueta  Date of Initial Visit: Type: THERAPY  Noted: 5/15/2025  Today's Date: 2025  Patient seen for 10 sessions       Visit Diagnoses:    ICD-10-CM ICD-9-CM   1. Chronic pain of right knee  M25.561 719.46    G89.29 338.29   2. Decreased range of motion (ROM) of right knee  M25.661 719.56   3. Activity intolerance  R68.89 780.99       Subjective   Language Line  ID: [id number]    Olga Peres reports: no new complaints. Felt fine after last treatment session.    Objective   See Exercise, Manual, and Modality Logs for complete treatment.       Assessment:  Pt tolerated today's treatment session well with no adverse responses during treatment session. Pt continues to display limitations including LE strength and ROM deficits but continues to display signs of strength progression. Pt will benefit from continued skilled PT services.       Plan:  Progress per POC.         Timed:         Manual Therapy:         mins  44078;     Therapeutic Exercise:    10     mins  32375;     Neuromuscular Deirdre:    10    mins  67317;    Therapeutic Activity:     10     mins  76938;     Gait Training:           mins  53480;     Ultrasound:          mins  34473;    Ionto                                   mins  65656  Self Care                            mins  44530  Traction          mins 86928      Un-Timed:  Canalith Repos         mins 27285  Electrical Stimulation:         mins  10548 ( );  Dry Needling          mins self-pay  Traction          mins 81541        Timed Treatment:   30   mins   Total Treatment:     46   mins    Chapito Mitchell PT  [place] License #: [id number]    Physical Therapist

## 2025-07-01 ENCOUNTER — TREATMENT (OUTPATIENT)
Dept: PHYSICAL THERAPY | Facility: CLINIC | Age: 75
End: 2025-07-01
Payer: MEDICARE

## 2025-07-01 DIAGNOSIS — G89.29 CHRONIC PAIN OF RIGHT KNEE: Primary | ICD-10-CM

## 2025-07-01 DIAGNOSIS — R68.89 ACTIVITY INTOLERANCE: ICD-10-CM

## 2025-07-01 DIAGNOSIS — M25.561 CHRONIC PAIN OF RIGHT KNEE: Primary | ICD-10-CM

## 2025-07-01 DIAGNOSIS — M25.661 DECREASED RANGE OF MOTION (ROM) OF RIGHT KNEE: ICD-10-CM

## 2025-07-01 NOTE — PROGRESS NOTES
Physical Therapy Daily Treatment Note  [facility] [place]   [address], [address]  [place] [address]  P: [phone]  F: [phone]    Patient: Olga Peres   : 1950  Referring practitioner: ARTEMIO Argueta  Date of Initial Visit: Type: THERAPY  Noted: 5/15/2025  Today's Date: 2025  Patient seen for 11 sessions       Visit Diagnoses:    ICD-10-CM ICD-9-CM   1. Chronic pain of right knee  M25.561 719.46    G89.29 338.29   2. Decreased range of motion (ROM) of right knee  M25.661 719.56   3. Activity intolerance  R68.89 780.99       Subjective     [employer] ID: [id number]    Olga Peres reports: no pain or new complaints. Has a lot of home exercises and would like to cut those down.    Objective   See Exercise, Manual, and Modality Logs for complete treatment.       Assessment:  Pt tolerated today's treatment session well with no adverse responses during treatment session. Pt continues to display limitations including LE strength deficits but has progressed very well. Her HEP was reviewed today and re-printed for her to add exercises. Pt will benefit from continued skilled PT services.       Plan:  May DC following next session if she continues to manage symptoms.        Timed:         Manual Therapy:         mins  96724;     Therapeutic Exercise:    24     mins  49949;     Neuromuscular Deirdre:        mins  14559;    Therapeutic Activity:     10     mins  95925;     Gait Training:           mins  62346;     Ultrasound:          mins  45763;    Ionto                                   mins  18906  Self Care                            mins  40844  Traction          mins 06027      Un-Timed:  Canalith Repos         mins 53317  Electrical Stimulation:         mins  62969 ( );  Dry Needling          mins self-pay  Traction          mins 40235        Timed Treatment:   34   mins   Total Treatment:     44   mins    Chapito Mitchell  PT  [place] License #: [id number]    Physical Therapist

## 2025-07-08 ENCOUNTER — TREATMENT (OUTPATIENT)
Dept: PHYSICAL THERAPY | Facility: CLINIC | Age: 75
End: 2025-07-08
Payer: MEDICARE

## 2025-07-08 DIAGNOSIS — M25.561 CHRONIC PAIN OF RIGHT KNEE: Primary | ICD-10-CM

## 2025-07-08 DIAGNOSIS — M25.661 DECREASED RANGE OF MOTION (ROM) OF RIGHT KNEE: ICD-10-CM

## 2025-07-08 DIAGNOSIS — R68.89 ACTIVITY INTOLERANCE: ICD-10-CM

## 2025-07-08 DIAGNOSIS — G89.29 CHRONIC PAIN OF RIGHT KNEE: Primary | ICD-10-CM

## 2025-07-08 NOTE — PROGRESS NOTES
Physical Therapy Daily Treatment Note  [facility] [place]   [address], [address]  [place] [address]  P: [phone]  F: [phone]    Patient: Olga Peres   : 1950  Referring practitioner: ARTEMIO Argueta  Date of Initial Visit: Type: THERAPY  Noted: 5/15/2025  Today's Date: 2025  Patient seen for 12 sessions       Visit Diagnoses:    ICD-10-CM ICD-9-CM   1. Chronic pain of right knee  M25.561 719.46    G89.29 338.29   2. Decreased range of motion (ROM) of right knee  M25.661 719.56   3. Activity intolerance  R68.89 780.99       Subjective   Language Line  ID: [id number]    Olga Peres reports: no new changes. Feeling strong. Ready for DC following today's session.    Objective          Active Range of Motion   Left Hip   Normal active range of motion    Right Hip   Normal active range of motion  Left Knee   Normal active range of motion    Right Knee   Normal active range of motion    Strength/Myotome Testing     Left Hip   Normal muscle strength    Right Hip   Normal muscle strength    Left Knee   Normal strength    Right Knee   Normal strength    Ambulation     Ambulation: Level Surfaces     Additional Level Surfaces Ambulation Details  WNL with and without walker.    Observational Gait   Gait: within functional limits   Walking speed and stride length within functional limits.       See Exercise, Manual, and Modality Logs for complete treatment.       Assessment:  Pt has progressed well and her LE strength and ROM have improved greatly. HEP reviewed today for further continuation at home independently. She is no longer appropriate for skilled PT services.      Plan:  DC today.        Timed:         Manual Therapy:         mins  52777;     Therapeutic Exercise:    17     mins  77140;     Neuromuscular Deirdre:    12    mins  50772;    Therapeutic Activity:     16     mins  90977;     Gait Training:           mins  98913;      Ultrasound:          mins  80697;    Ionto                                   mins  46679  Self Care                       10     mins  30158  Traction          mins 20945      Un-Timed:  Canalith Repos         mins 78100  Electrical Stimulation:         mins  97801 ( );  Dry Needling          mins self-pay  Traction          mins 63636        Timed Treatment:   55   mins   Total Treatment:     55   mins    Chapito Mitchell PT  [place] License #: [id number]    Physical Therapist

## 2025-08-06 ENCOUNTER — OFFICE VISIT (OUTPATIENT)
Dept: FAMILY MEDICINE CLINIC | Facility: CLINIC | Age: 75
End: 2025-08-06
Payer: MEDICARE

## 2025-08-06 VITALS
SYSTOLIC BLOOD PRESSURE: 138 MMHG | HEIGHT: 61 IN | OXYGEN SATURATION: 98 % | TEMPERATURE: 98.6 F | HEART RATE: 86 BPM | DIASTOLIC BLOOD PRESSURE: 54 MMHG | BODY MASS INDEX: 29.45 KG/M2 | WEIGHT: 156 LBS

## 2025-08-06 DIAGNOSIS — E11.9 TYPE 2 DIABETES MELLITUS WITHOUT COMPLICATION, WITHOUT LONG-TERM CURRENT USE OF INSULIN: Primary | ICD-10-CM

## 2025-08-06 DIAGNOSIS — Z12.31 ENCOUNTER FOR SCREENING MAMMOGRAM FOR MALIGNANT NEOPLASM OF BREAST: ICD-10-CM

## 2025-08-06 DIAGNOSIS — M79.89 SWELLING OF RIGHT LOWER EXTREMITY: ICD-10-CM

## 2025-08-06 LAB
EXPIRATION DATE: ABNORMAL
HBA1C MFR BLD: 6.1 % (ref 4.5–5.7)
Lab: ABNORMAL

## 2025-08-06 RX ORDER — CHLORTHALIDONE 25 MG/1
1 TABLET ORAL DAILY
COMMUNITY
Start: 2025-04-28

## 2025-08-12 ENCOUNTER — HOSPITAL ENCOUNTER (OUTPATIENT)
Dept: CARDIOLOGY | Facility: HOSPITAL | Age: 75
Discharge: HOME | End: 2025-08-12
Admitting: STUDENT IN AN ORGANIZED HEALTH CARE EDUCATION/TRAINING PROGRAM
Payer: MEDICARE

## 2025-08-12 ENCOUNTER — DOCUMENTATION (OUTPATIENT)
Dept: FAMILY MEDICINE CLINIC | Facility: CLINIC | Age: 75
End: 2025-08-12
Payer: MEDICARE

## 2025-08-12 DIAGNOSIS — I82.431 ACUTE DEEP VEIN THROMBOSIS (DVT) OF POPLITEAL VEIN OF RIGHT LOWER EXTREMITY: Primary | ICD-10-CM

## 2025-08-12 DIAGNOSIS — M79.89 SWELLING OF RIGHT LOWER EXTREMITY: ICD-10-CM

## 2025-08-12 LAB
BH CV LOW VAS RIGHT GASTRONEMIUS VESSEL: 1
BH CV LOWER VASCULAR LEFT COMMON FEMORAL AUGMENT: NORMAL
BH CV LOWER VASCULAR LEFT COMMON FEMORAL COMPETENT: NORMAL
BH CV LOWER VASCULAR LEFT COMMON FEMORAL COMPRESS: NORMAL
BH CV LOWER VASCULAR LEFT COMMON FEMORAL PHASIC: NORMAL
BH CV LOWER VASCULAR LEFT COMMON FEMORAL SPONT: NORMAL
BH CV LOWER VASCULAR RIGHT COMMON FEMORAL AUGMENT: NORMAL
BH CV LOWER VASCULAR RIGHT COMMON FEMORAL COMPETENT: NORMAL
BH CV LOWER VASCULAR RIGHT COMMON FEMORAL COMPRESS: NORMAL
BH CV LOWER VASCULAR RIGHT COMMON FEMORAL PHASIC: NORMAL
BH CV LOWER VASCULAR RIGHT COMMON FEMORAL SPONT: NORMAL
BH CV LOWER VASCULAR RIGHT DISTAL FEMORAL COMPRESS: NORMAL
BH CV LOWER VASCULAR RIGHT GASTRONEMIUS COMPRESS: NORMAL
BH CV LOWER VASCULAR RIGHT GASTRONEMIUS THROMBUS: NORMAL
BH CV LOWER VASCULAR RIGHT GREATER SAPH AK COMPRESS: NORMAL
BH CV LOWER VASCULAR RIGHT GREATER SAPH BK COMPRESS: NORMAL
BH CV LOWER VASCULAR RIGHT LESSER SAPH COMPRESS: NORMAL
BH CV LOWER VASCULAR RIGHT MID FEMORAL AUGMENT: NORMAL
BH CV LOWER VASCULAR RIGHT MID FEMORAL COMPETENT: NORMAL
BH CV LOWER VASCULAR RIGHT MID FEMORAL COMPRESS: NORMAL
BH CV LOWER VASCULAR RIGHT MID FEMORAL PHASIC: NORMAL
BH CV LOWER VASCULAR RIGHT MID FEMORAL SPONT: NORMAL
BH CV LOWER VASCULAR RIGHT PERONEAL COMPRESS: NORMAL
BH CV LOWER VASCULAR RIGHT POPLITEAL AUGMENT: NORMAL
BH CV LOWER VASCULAR RIGHT POPLITEAL COMPETENT: NORMAL
BH CV LOWER VASCULAR RIGHT POPLITEAL COMPRESS: NORMAL
BH CV LOWER VASCULAR RIGHT POPLITEAL PHASIC: NORMAL
BH CV LOWER VASCULAR RIGHT POPLITEAL SPONT: NORMAL
BH CV LOWER VASCULAR RIGHT POSTERIOR TIBIAL COMPRESS: NORMAL
BH CV LOWER VASCULAR RIGHT PROFUNDA FEMORAL COMPRESS: NORMAL
BH CV LOWER VASCULAR RIGHT PROXIMAL FEMORAL COMPRESS: NORMAL
BH CV LOWER VASCULAR RIGHT SAPHENOFEMORAL JUNCTION COMPRESS: NORMAL
BH CV VAS PRELIMINARY FINDINGS SCRIPTING: 1

## 2025-08-12 PROCEDURE — 93971 EXTREMITY STUDY: CPT

## (undated) DEVICE — APPL DURAPREP IODOPHOR APL 26ML

## (undated) DEVICE — GLV SURG SENSICARE W/ALOE PF LF 7.5 STRL

## (undated) DEVICE — UNDERGLV SURG BIOGEL INDICATOR LF PF 7.5

## (undated) DEVICE — THE STERILE LIGHT HANDLE COVER IS USED WITH STERIS SURGICAL LIGHTING AND VISUALIZATION SYSTEMS.

## (undated) DEVICE — PATIENT RETURN ELECTRODE, SINGLE-USE, CONTACT QUALITY MONITORING, ADULT, WITH 9FT CORD, FOR PATIENTS WEIGING OVER 33LBS. (15KG): Brand: MEGADYNE

## (undated) DEVICE — QUINCKE POINT SPINAL NEEDLE, BLACK: Brand: RELI

## (undated) DEVICE — SKIN PREP TRAY 4 COMPARTM TRAY: Brand: MEDLINE INDUSTRIES, INC.

## (undated) DEVICE — REFLECTION FLEXIBLE DRILL 25MM: Brand: REFLECTION

## (undated) DEVICE — SPONGE,LAP,18"X18",DLX,XR,ST,5/PK,40/PK: Brand: MEDLINE

## (undated) DEVICE — 3M™ IOBAN™ 2 ANTIMICROBIAL INCISE DRAPE 6640EZ: Brand: IOBAN™ 2

## (undated) DEVICE — PENCL SMOKE/EVAC MEGADYNE TELESCP 10FT

## (undated) DEVICE — PK ANT HIP 40

## (undated) DEVICE — ANTIBACTERIAL UNDYED BRAIDED (POLYGLACTIN 910), SYNTHETIC ABSORBABLE SUTURE: Brand: COATED VICRYL

## (undated) DEVICE — DECANTER BAG 9": Brand: MEDLINE INDUSTRIES, INC.

## (undated) DEVICE — GLV SURG SENSICARE PI PF LF 7 GRN STRL

## (undated) DEVICE — DUAL CUT SAGITTAL BLADE

## (undated) DEVICE — GLV SURG SENSICARE W/ALOE PF LF 8 STRL

## (undated) DEVICE — SUT VIC PLS UD BR COAT ANTIB ABS CT1 SZ1 36MM 27IN VCPP40D

## (undated) DEVICE — GLV SURG SENSICARE PI MIC PF SZ7 LF STRL

## (undated) DEVICE — TOWEL,OR,DSP,ST,BLUE,STD,4/PK,20PK/CS: Brand: MEDLINE

## (undated) DEVICE — SYS SKIN EXOFIN WND CLS 4X22CM

## (undated) DEVICE — STPLR SKIN VISISTAT WD 35CT

## (undated) DEVICE — SUT PDS 1 CT1 36IN Z347H

## (undated) DEVICE — PREP SOL POVIDONE/IODINE BT 4OZ

## (undated) DEVICE — WEREWOLF FASTSEAL 6.0 HEMOSTASIS WAND: Brand: FASTSEAL 6.0 HEMOSTASIS WAND

## (undated) DEVICE — TRAP FLD MINIVAC MEGADYNE 100ML

## (undated) DEVICE — GLV SURG BIOGEL M LTX PF 7 1/2

## (undated) DEVICE — 450 ML BOTTLE OF 0.05% CHLORHEXIDINE GLUCONATE IN 99.95% STERILE WATER FOR IRRIGATION, USP AND APPLICATOR.: Brand: IRRISEPT ANTIMICROBIAL WOUND LAVAGE